# Patient Record
Sex: MALE | Race: WHITE | NOT HISPANIC OR LATINO | Employment: OTHER
[De-identification: names, ages, dates, MRNs, and addresses within clinical notes are randomized per-mention and may not be internally consistent; named-entity substitution may affect disease eponyms.]

---
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
## 2022-10-03 ENCOUNTER — HOSPITAL ENCOUNTER (EMERGENCY)
Age: 22
Discharge: HOME | End: 2022-10-03

## 2022-10-03 ENCOUNTER — APPOINTMENT (OUTPATIENT)
Dept: GENERAL RADIOLOGY | Age: 22
End: 2022-10-03
Attending: NURSE PRACTITIONER

## 2022-10-03 VITALS
TEMPERATURE: 97.5 F | HEART RATE: 55 BPM | OXYGEN SATURATION: 97 % | RESPIRATION RATE: 14 BRPM | DIASTOLIC BLOOD PRESSURE: 50 MMHG | SYSTOLIC BLOOD PRESSURE: 135 MMHG | WEIGHT: 181 LBS

## 2022-10-03 DIAGNOSIS — M54.42 ACUTE BILATERAL LOW BACK PAIN WITH BILATERAL SCIATICA: Primary | ICD-10-CM

## 2022-10-03 DIAGNOSIS — M54.41 ACUTE BILATERAL LOW BACK PAIN WITH BILATERAL SCIATICA: Primary | ICD-10-CM

## 2022-10-03 PROCEDURE — 72100 X-RAY EXAM L-S SPINE 2/3 VWS: CPT

## 2022-10-03 PROCEDURE — 10004651 HB RX, NO CHARGE ITEM: Performed by: NURSE PRACTITIONER

## 2022-10-03 PROCEDURE — 99283 EMERGENCY DEPT VISIT LOW MDM: CPT

## 2022-10-03 PROCEDURE — 10002803 HB RX 637: Performed by: NURSE PRACTITIONER

## 2022-10-03 RX ORDER — LIDOCAINE 50 MG/G
1 PATCH TOPICAL EVERY 24 HOURS
Qty: 10 PATCH | Refills: 0 | Status: SHIPPED | OUTPATIENT
Start: 2022-10-03

## 2022-10-03 RX ORDER — IBUPROFEN 600 MG/1
600 TABLET ORAL EVERY 6 HOURS PRN
Qty: 20 TABLET | Refills: 0 | Status: SHIPPED | OUTPATIENT
Start: 2022-10-03

## 2022-10-03 RX ORDER — METHYLPREDNISOLONE 4 MG/1
4 TABLET ORAL SEE ADMIN INSTRUCTIONS
Qty: 21 TABLET | Refills: 0 | Status: SHIPPED | OUTPATIENT
Start: 2022-10-03

## 2022-10-03 RX ORDER — ACETAMINOPHEN 500 MG
1000 TABLET ORAL ONCE
Status: COMPLETED | OUTPATIENT
Start: 2022-10-03 | End: 2022-10-03

## 2022-10-03 RX ORDER — CYCLOBENZAPRINE HCL 10 MG
10 TABLET ORAL 3 TIMES DAILY PRN
Qty: 15 TABLET | Refills: 0 | Status: SHIPPED | OUTPATIENT
Start: 2022-10-03

## 2022-10-03 RX ORDER — IBUPROFEN 600 MG/1
600 TABLET ORAL ONCE
Status: COMPLETED | OUTPATIENT
Start: 2022-10-03 | End: 2022-10-03

## 2022-10-03 RX ORDER — CYCLOBENZAPRINE HCL 10 MG
10 TABLET ORAL ONCE
Status: COMPLETED | OUTPATIENT
Start: 2022-10-03 | End: 2022-10-03

## 2022-10-03 RX ADMIN — ACETAMINOPHEN 1000 MG: 500 TABLET ORAL at 14:16

## 2022-10-03 RX ADMIN — CYCLOBENZAPRINE HYDROCHLORIDE 10 MG: 10 TABLET, FILM COATED ORAL at 14:16

## 2022-10-03 RX ADMIN — IBUPROFEN 600 MG: 600 TABLET ORAL at 14:15

## 2022-10-03 ASSESSMENT — PAIN SCALES - GENERAL
PAINLEVEL_OUTOF10: 9
PAINLEVEL_OUTOF10: 8

## 2022-10-03 ASSESSMENT — ENCOUNTER SYMPTOMS: BACK PAIN: 1

## 2024-10-11 ENCOUNTER — APPOINTMENT (OUTPATIENT)
Dept: GENERAL RADIOLOGY | Facility: HOSPITAL | Age: 24
End: 2024-10-11
Attending: EMERGENCY MEDICINE
Payer: OTHER GOVERNMENT

## 2024-10-11 ENCOUNTER — HOSPITAL ENCOUNTER (INPATIENT)
Facility: HOSPITAL | Age: 24
LOS: 6 days | Discharge: HOME | End: 2024-10-17
Attending: EMERGENCY MEDICINE | Admitting: INTERNAL MEDICINE
Payer: OTHER GOVERNMENT

## 2024-10-11 DIAGNOSIS — S82.392A CLOSED FRACTURE OF POSTERIOR MALLEOLUS OF LEFT TIBIA, INITIAL ENCOUNTER: ICD-10-CM

## 2024-10-11 DIAGNOSIS — S82.302A CLOSED FRACTURE OF DISTAL END OF LEFT TIBIA, UNSPECIFIED FRACTURE MORPHOLOGY, INITIAL ENCOUNTER: Primary | ICD-10-CM

## 2024-10-11 PROBLEM — S82.209A TIBIA FRACTURE: Status: ACTIVE | Noted: 2024-10-11

## 2024-10-11 LAB
ANION GAP SERPL CALC-SCNC: 9 MMOL/L (ref 0–18)
BASOPHILS # BLD AUTO: 0.04 X10(3) UL (ref 0–0.2)
BASOPHILS NFR BLD AUTO: 0.4 %
BUN BLD-MCNC: 13 MG/DL (ref 9–23)
BUN/CREAT SERPL: 14.8 (ref 10–20)
CALCIUM BLD-MCNC: 7 MG/DL (ref 8.7–10.4)
CHLORIDE SERPL-SCNC: 116 MMOL/L (ref 98–112)
CO2 SERPL-SCNC: 19 MMOL/L (ref 21–32)
CREAT BLD-MCNC: 0.88 MG/DL
DEPRECATED RDW RBC AUTO: 42.8 FL (ref 35.1–46.3)
EGFRCR SERPLBLD CKD-EPI 2021: 123 ML/MIN/1.73M2 (ref 60–?)
EOSINOPHIL # BLD AUTO: 0.03 X10(3) UL (ref 0–0.7)
EOSINOPHIL NFR BLD AUTO: 0.3 %
ERYTHROCYTE [DISTWIDTH] IN BLOOD BY AUTOMATED COUNT: 12.7 % (ref 11–15)
GLUCOSE BLD-MCNC: 82 MG/DL (ref 70–99)
HCT VFR BLD AUTO: 45.9 %
HGB BLD-MCNC: 15.9 G/DL
IMM GRANULOCYTES # BLD AUTO: 0.03 X10(3) UL (ref 0–1)
IMM GRANULOCYTES NFR BLD: 0.3 %
LYMPHOCYTES # BLD AUTO: 1.72 X10(3) UL (ref 1–4)
LYMPHOCYTES NFR BLD AUTO: 15.6 %
MCH RBC QN AUTO: 31.9 PG (ref 26–34)
MCHC RBC AUTO-ENTMCNC: 34.6 G/DL (ref 31–37)
MCV RBC AUTO: 92.2 FL
MONOCYTES # BLD AUTO: 0.62 X10(3) UL (ref 0.1–1)
MONOCYTES NFR BLD AUTO: 5.6 %
NEUTROPHILS # BLD AUTO: 8.61 X10 (3) UL (ref 1.5–7.7)
NEUTROPHILS # BLD AUTO: 8.61 X10(3) UL (ref 1.5–7.7)
NEUTROPHILS NFR BLD AUTO: 77.8 %
OSMOLALITY SERPL CALC.SUM OF ELEC: 297 MOSM/KG (ref 275–295)
PLATELET # BLD AUTO: 251 10(3)UL (ref 150–450)
POTASSIUM SERPL-SCNC: 2.9 MMOL/L (ref 3.5–5.1)
RBC # BLD AUTO: 4.98 X10(6)UL
SODIUM SERPL-SCNC: 144 MMOL/L (ref 136–145)
WBC # BLD AUTO: 11.1 X10(3) UL (ref 4–11)

## 2024-10-11 PROCEDURE — 99222 1ST HOSP IP/OBS MODERATE 55: CPT | Performed by: INTERNAL MEDICINE

## 2024-10-11 PROCEDURE — 73590 X-RAY EXAM OF LOWER LEG: CPT | Performed by: EMERGENCY MEDICINE

## 2024-10-11 PROCEDURE — 73610 X-RAY EXAM OF ANKLE: CPT | Performed by: EMERGENCY MEDICINE

## 2024-10-11 RX ORDER — POTASSIUM CHLORIDE 1500 MG/1
40 TABLET, EXTENDED RELEASE ORAL ONCE
Status: COMPLETED | OUTPATIENT
Start: 2024-10-11 | End: 2024-10-11

## 2024-10-11 RX ORDER — MORPHINE SULFATE 2 MG/ML
1 INJECTION, SOLUTION INTRAMUSCULAR; INTRAVENOUS EVERY 2 HOUR PRN
Status: DISCONTINUED | OUTPATIENT
Start: 2024-10-11 | End: 2024-10-13

## 2024-10-11 RX ORDER — ACETAMINOPHEN 500 MG
500 TABLET ORAL EVERY 4 HOURS PRN
Status: DISCONTINUED | OUTPATIENT
Start: 2024-10-11 | End: 2024-10-17

## 2024-10-11 RX ORDER — SUMATRIPTAN 100 MG/1
100 TABLET, FILM COATED ORAL EVERY 6 HOURS PRN
COMMUNITY

## 2024-10-11 RX ORDER — ONDANSETRON 2 MG/ML
4 INJECTION INTRAMUSCULAR; INTRAVENOUS EVERY 6 HOURS PRN
Status: DISCONTINUED | OUTPATIENT
Start: 2024-10-11 | End: 2024-10-17

## 2024-10-11 RX ORDER — PROCHLORPERAZINE EDISYLATE 5 MG/ML
5 INJECTION INTRAMUSCULAR; INTRAVENOUS EVERY 8 HOURS PRN
Status: DISCONTINUED | OUTPATIENT
Start: 2024-10-11 | End: 2024-10-17

## 2024-10-11 RX ORDER — MORPHINE SULFATE 4 MG/ML
4 INJECTION, SOLUTION INTRAMUSCULAR; INTRAVENOUS ONCE
Status: COMPLETED | OUTPATIENT
Start: 2024-10-11 | End: 2024-10-11

## 2024-10-11 RX ORDER — MORPHINE SULFATE 4 MG/ML
4 INJECTION, SOLUTION INTRAMUSCULAR; INTRAVENOUS EVERY 2 HOUR PRN
Status: DISCONTINUED | OUTPATIENT
Start: 2024-10-11 | End: 2024-10-13

## 2024-10-11 RX ORDER — ONDANSETRON 2 MG/ML
4 INJECTION INTRAMUSCULAR; INTRAVENOUS ONCE
Status: COMPLETED | OUTPATIENT
Start: 2024-10-11 | End: 2024-10-11

## 2024-10-11 RX ORDER — MORPHINE SULFATE 2 MG/ML
2 INJECTION, SOLUTION INTRAMUSCULAR; INTRAVENOUS EVERY 2 HOUR PRN
Status: DISCONTINUED | OUTPATIENT
Start: 2024-10-11 | End: 2024-10-13

## 2024-10-11 RX ORDER — MELATONIN
3 NIGHTLY PRN
Status: DISCONTINUED | OUTPATIENT
Start: 2024-10-11 | End: 2024-10-17

## 2024-10-12 ENCOUNTER — APPOINTMENT (OUTPATIENT)
Dept: CT IMAGING | Facility: HOSPITAL | Age: 24
End: 2024-10-12
Attending: ORTHOPAEDIC SURGERY
Payer: OTHER GOVERNMENT

## 2024-10-12 LAB
ANION GAP SERPL CALC-SCNC: 7 MMOL/L (ref 0–18)
ANTIBODY SCREEN: NEGATIVE
BUN BLD-MCNC: 13 MG/DL (ref 9–23)
BUN/CREAT SERPL: 11.6 (ref 10–20)
CALCIUM BLD-MCNC: 8.9 MG/DL (ref 8.7–10.4)
CHLORIDE SERPL-SCNC: 107 MMOL/L (ref 98–112)
CO2 SERPL-SCNC: 28 MMOL/L (ref 21–32)
CREAT BLD-MCNC: 1.12 MG/DL
EGFRCR SERPLBLD CKD-EPI 2021: 94 ML/MIN/1.73M2 (ref 60–?)
GLUCOSE BLD-MCNC: 81 MG/DL (ref 70–99)
MRSA DNA SPEC QL NAA+PROBE: NEGATIVE
OSMOLALITY SERPL CALC.SUM OF ELEC: 293 MOSM/KG (ref 275–295)
POTASSIUM SERPL-SCNC: 4.4 MMOL/L (ref 3.5–5.1)
RH BLOOD TYPE: POSITIVE
RH BLOOD TYPE: POSITIVE
SODIUM SERPL-SCNC: 142 MMOL/L (ref 136–145)

## 2024-10-12 PROCEDURE — 99233 SBSQ HOSP IP/OBS HIGH 50: CPT | Performed by: HOSPITALIST

## 2024-10-12 PROCEDURE — 73700 CT LOWER EXTREMITY W/O DYE: CPT | Performed by: ORTHOPAEDIC SURGERY

## 2024-10-12 RX ORDER — KETOROLAC TROMETHAMINE 15 MG/ML
15 INJECTION, SOLUTION INTRAMUSCULAR; INTRAVENOUS EVERY 6 HOURS PRN
Status: DISPENSED | OUTPATIENT
Start: 2024-10-12 | End: 2024-10-14

## 2024-10-12 RX ORDER — MORPHINE SULFATE 2 MG/ML
2 INJECTION, SOLUTION INTRAMUSCULAR; INTRAVENOUS ONCE
Status: COMPLETED | OUTPATIENT
Start: 2024-10-12 | End: 2024-10-12

## 2024-10-12 RX ORDER — HYDROCODONE BITARTRATE AND ACETAMINOPHEN 10; 325 MG/1; MG/1
1 TABLET ORAL EVERY 4 HOURS PRN
Status: DISCONTINUED | OUTPATIENT
Start: 2024-10-12 | End: 2024-10-17

## 2024-10-12 RX ORDER — SODIUM CHLORIDE 9 MG/ML
INJECTION, SOLUTION INTRAVENOUS CONTINUOUS
Status: DISCONTINUED | OUTPATIENT
Start: 2024-10-12 | End: 2024-10-17

## 2024-10-12 NOTE — PROGRESS NOTES
[facility]  part of [facility]    Progress Note    Dann Toro Patient Status:  Inpatient    2000 MRN [id number]   Location [facility] 4W/SW/SE Attending Michelle Chapman MD   Hosp Day # 1 PCP No primary care provider on file.     Chief Complaint: left tibia fx    SUBJECTIVE:    No acute events overnight.  Patient denies chest pain, sob.  No fevers/chills.  No n/v/abd pain.  Ankle pain controlled, waiting for sx.    10 ROS completed and otherwise negative.    OBJECTIVE:  Vital signs in last 24 hours:  /78 (BP Location: Right arm)   Pulse 64   Temp 98.3 °F (36.8 °C) (Oral)   Resp 18   Ht 5' 9\" (1.753 m)   Wt 186 lb 4.8 oz (84.5 kg)   SpO2 100%   BMI 27.51 kg/m²     Intake/Output:  No intake or output data in the 24 hours ending 10/12/24 1107    Wt Readings from Last 3 Encounters:   10/12/24 186 lb 4.8 oz (84.5 kg)       Exam     Gen: No acute distress  Pulm: Lungs clear, normal respiratory effort  CV: Heart with regular rate and rhythm  Abd: Abdomen soft, nontender, bowel sounds present  Neuro: No acute focal deficits  MSK: moves extremities  Skin: Warm and dry  Psych: Normal affect  Ext: no c/c/e    Data Review:     Labs:   Lab Results   Component Value Date    WBC 11.1 10/11/2024    HGB 15.9 10/11/2024    HCT 45.9 10/11/2024    .0 10/11/2024    CREATSERUM 1.12 10/12/2024    BUN 13 10/12/2024     10/12/2024    K 4.4 10/12/2024     10/12/2024    CO2 28.0 10/12/2024    GLU 81 10/12/2024    CA 8.9 10/12/2024       Imaging: Reviewed    XR TIBIA + FIBULA (2 VIEWS), LEFT (CPT=73590)    Result Date: 10/11/2024  CONCLUSION:   Left Tibia/fibula:  Oblique fracture of the distal tibial diaphysis with mild displacement and comminution.  Nondisplaced posterior malleolar fracture of the distal tibia with extension to the tibiotalar joint.  The fibula appears intact.  Soft tissue swelling of the lower leg.  Left ankle:  Fractures of the distal tibia  as above.  No acute dislocation or subluxation. No swelling of the ankle.    elm-remote     Dictated by (CST): Coy Loyola MD on 10/11/2024 at 10:24 PM     Finalized by (CST): Coy Loyola MD on 10/11/2024 at 10:28 PM          XR ANKLE (MIN 3 VIEWS), LEFT (CPT=73610)    Result Date: 10/11/2024  CONCLUSION:   Left Tibia/fibula:  Oblique fracture of the distal tibial diaphysis with mild displacement and comminution.  Nondisplaced posterior malleolar fracture of the distal tibia with extension to the tibiotalar joint.  The fibula appears intact.  Soft tissue swelling of the lower leg.  Left ankle:  Fractures of the distal tibia as above.  No acute dislocation or subluxation. No swelling of the ankle.    elm-remote     Dictated by (CST): Coy Loyola MD on 10/11/2024 at 10:24 PM     Finalized by (CST): Coy Loyola MD on 10/11/2024 at 10:28 PM           Meds:   Current Facility-Administered Medications   Medication Dose Route Frequency    ketorolac (Toradol) 15 MG/ML injection 15 mg  15 mg Intravenous Q6H PRN    sodium chloride 0.9% infusion   Intravenous Continuous    acetaminophen (Tylenol Extra Strength) tab 500 mg  500 mg Oral Q4H PRN    morphINE PF 2 MG/ML injection 1 mg  1 mg Intravenous Q2H PRN    Or    morphINE PF 2 MG/ML injection 2 mg  2 mg Intravenous Q2H PRN    Or    morphINE PF 4 MG/ML injection 4 mg  4 mg Intravenous Q2H PRN    melatonin tab 3 mg  3 mg Oral Nightly PRN    ondansetron (Zofran) 4 MG/2ML injection 4 mg  4 mg Intravenous Q6H PRN    prochlorperazine (Compazine) 10 MG/2ML injection 5 mg  5 mg Intravenous Q8H PRN         Assessment  Patient Active Problem List   Diagnosis    Tibia fracture    Closed fracture of distal end of left tibia, unspecified fracture morphology, initial encounter    Closed fracture of posterior malleolus of left tibia, initial encounter       Plan:     Left tibia/ankle fracture  -Admit  -N.p.o. pending surgery  -Pain control with IV pain meds prn  -Orthopedic consult.   To the OR today  -Nonweightbearing     Hypokalemia  -Replace per protocol     Mild leukocytosis, likely reactive     HTN, likely pain induced  -Optimize pain control  -Improved     Prophylaxis:   DVT with scd for now and post op per ortho    Dispo: pending clinical course    Global A/P  - reviewed labs and vitals from today  - reviewed notes of the day  - cbc, bmp, mag ordered for tomorrow  - discussed need to stay in the hospital today due to need for surgery  - cont IV pain meds prn  - discussed with patient/RN and care team    MDM: High complexity-  IV meds requiring close inpatient monitoring. I personally spent time on chart/note review, review of labs/imaging, discussion with patient, physical exam, discussion with staff, writing progress note, and discussion of plan of care.      Michelle Chapman MD  10/12/2024  11:07 AM

## 2024-10-12 NOTE — PLAN OF CARE
Patient AOX4. Room air. General diet, tolerated well. Refusing SCDS. Voiding via urinal. PRN norco morphine toradol given for pain management. Call light within reach.     Problem: Patient Centered Care  Goal: Patient preferences are identified and integrated in the patient's plan of care  Description: Interventions:  - What would you like us to know as we care for you?   - Provide timely, complete, and accurate information to patient/family  - Incorporate patient and family knowledge, values, beliefs, and cultural backgrounds into the planning and delivery of care  - Encourage patient/family to participate in care and decision-making at the level they choose  - Honor patient and family perspectives and choices  Outcome: Progressing     Problem: Patient/Family Goals  Goal: Patient/Family Long Term Goal  Description: Patient's Long Term Goal:     Interventions:  -   - See additional Care Plan goals for specific interventions  Outcome: Progressing  Goal: Patient/Family Short Term Goal  Description: Patient's Short Term Goal:     Interventions:   - See additional Care Plan goals for specific interventions  Outcome: Progressing     Problem: PAIN - ADULT  Goal: Verbalizes/displays adequate comfort level or patient's stated pain goal  Description: INTERVENTIONS:  - Encourage pt to monitor pain and request assistance  - Assess pain using appropriate pain scale  - Administer analgesics based on type and severity of pain and evaluate response  - Implement non-pharmacological measures as appropriate and evaluate response  - Consider cultural and social influences on pain and pain management  - Manage/alleviate anxiety  - Utilize distraction and/or relaxation techniques  - Monitor for opioid side effects  - Notify MD/LIP if interventions unsuccessful or patient reports new pain  - Anticipate increased pain with activity and pre-medicate as appropriate  Outcome: Progressing     Problem: SAFETY ADULT - FALL  Goal: Free from fall  injury  Description: INTERVENTIONS:  - Assess pt frequently for physical needs  - Identify cognitive and physical deficits and behaviors that affect risk of falls.  - [place] fall precautions as indicated by assessment.  - Educate pt/family on patient safety including physical limitations  - Instruct pt to call for assistance with activity based on assessment  - Modify environment to reduce risk of injury  - Provide assistive devices as appropriate  - Consider OT/PT consult to assist with strengthening/mobility  - Encourage toileting schedule  Outcome: Progressing

## 2024-10-12 NOTE — SPIRITUAL CARE NOTE
Spiritual Care Visit Note    Patient Name: Dann Toro Date of Spiritual Care Visit: 10/12/24   : 2000 Primary Dx: Closed fracture of distal end of left tibia, unspecified fracture morphology, initial encounter       Referred By: Referral From: Nurse    Spiritual Care Taxonomy:    Intended Effects: Promote a sense of peace    Methods: Offer spiritual/Hindu support    Interventions: [place]    Visit Type/Summary:     - Spiritual Care: Responded to a request for spiritual care and assessed the patient for spiritual care needs.  remains available as needed for follow up.    Spiritual Care support can be requested via an [facility] consult. For urgent/immediate needs, please contact the On Call  at: [place]: ext [phone]         Rev. Shonda Barajas

## 2024-10-12 NOTE — PLAN OF CARE
ED admit for tibia fracture. Pt alert and oriented x4 on room air. Spouse at bedside overnight. Splint to LLE clean, dry, and intact. Sensation intact, able to wiggle toes. Declining SCDs, encouraged RLE ankle pumps. Pain mainly located in L ankle with a little pain in R ankle per pt. PRN morphine and heat packs given for pain. NWB to LLE. NPO since midnight. Call light within reach. Ortho to evaluate.     Problem: Patient Centered Care  Goal: Patient preferences are identified and integrated in the patient's plan of care  Description: Interventions:  - What would you like us to know as we care for you?   - Provide timely, complete, and accurate information to patient/family  - Incorporate patient and family knowledge, values, beliefs, and cultural backgrounds into the planning and delivery of care  - Encourage patient/family to participate in care and decision-making at the level they choose  - Honor patient and family perspectives and choices  Outcome: Progressing     Problem: Patient/Family Goals  Goal: Patient/Family Long Term Goal  Description: Patient's Long Term Goal:     Interventions:  -  - See additional Care Plan goals for specific interventions  Outcome: Progressing  Goal: Patient/Family Short Term Goal  Description: Patient's Short Term Goal:     Interventions:   -   - See additional Care Plan goals for specific interventions  Outcome: Progressing     Problem: PAIN - ADULT  Goal: Verbalizes/displays adequate comfort level or patient's stated pain goal  Description: INTERVENTIONS:  - Encourage pt to monitor pain and request assistance  - Assess pain using appropriate pain scale  - Administer analgesics based on type and severity of pain and evaluate response  - Implement non-pharmacological measures as appropriate and evaluate response  - Consider cultural and social influences on pain and pain management  - Manage/alleviate anxiety  - Utilize distraction and/or relaxation techniques  - Monitor for  opioid side effects  - Notify MD/LIP if interventions unsuccessful or patient reports new pain  - Anticipate increased pain with activity and pre-medicate as appropriate  Outcome: Progressing     Problem: SAFETY ADULT - FALL  Goal: Free from fall injury  Description: INTERVENTIONS:  - Assess pt frequently for physical needs  - Identify cognitive and physical deficits and behaviors that affect risk of falls.  - [place] fall precautions as indicated by assessment.  - Educate pt/family on patient safety including physical limitations  - Instruct pt to call for assistance with activity based on assessment  - Modify environment to reduce risk of injury  - Provide assistive devices as appropriate  - Consider OT/PT consult to assist with strengthening/mobility  - Encourage toileting schedule  Outcome: Progressing

## 2024-10-12 NOTE — ED QUICK NOTES
Orders for admission, patient is aware of plan and ready to go upstairs. Any questions, please call ED RN Natasha at extension [phone].     Patient Covid vaccination status: Fully vaccinated     COVID Test Ordered in ED: None    COVID Suspicion at Admission: N/A    Running Infusions:  None    Mental Status/LOC at time of transport: AOx4    Other pertinent information:   CIWA score: N/A   NIH score:  N/A

## 2024-10-12 NOTE — CONSULTS
Chart and imaging reviewed, left tibia fracture, plan for delayed nailing.  No evidence of current or evolving compartment syndrome.  May have diet today and tomorrow, plan for surgery Monday. Recommend ASA for DVT ppx.      Cancienne

## 2024-10-12 NOTE — ED PROVIDER NOTES
Patient Seen in: [facility] Emergency Department    History     Chief Complaint   Patient presents with    Leg or Foot Injury       HPI    History is provided by patient/independent historian: patient, EMS  24 year old male with  history of migraine here with complaints of a fall while playing ice hockey.  Patient reports falling to his knees and turning around into the baseboards, using his ankles to brace himself.  He was unable to get up after that.  Medics report needing to cut off his boot.  Positive deformity per them.  No numbness.    History reviewed.   Past Medical History:    Migraine         History reviewed. History reviewed. No pertinent surgical history.      Home Medications reviewed :  Prescriptions Prior to Admission[1]      History reviewed.   Social History     Socioeconomic History    Marital status:    Tobacco Use    Smoking status: Never    Smokeless tobacco: Never   Substance and Sexual Activity    Alcohol use: Not Currently    Drug use: Never         ROS  Review of Systems   Respiratory:  Negative for shortness of breath.    Cardiovascular:  Negative for chest pain.   Musculoskeletal:  Positive for arthralgias and gait problem.   All other systems reviewed and are negative.     All other pertinent organ systems are reviewed and are negative.      Physical Exam     ED Triage Vitals [10/11/24 2102]   BP (!) 162/77   Pulse 109   Resp 22   Temp    Temp src    SpO2 96 %   O2 Device None (Room air)     Vital signs reviewed.      Physical Exam  Vitals and nursing note reviewed.   Cardiovascular:      Pulses: Normal pulses.   Pulmonary:      Effort: No respiratory distress.   Abdominal:      General: There is no distension.   Musculoskeletal:      Comments: L ankle tender to palpation over b/l malleoli, no fibular head tenderness, 2+ dp pulse, able to wiggle toes   Neurological:      Mental Status: He is alert.         ED Course       Labs:     Labs Reviewed   CBC WITH DIFFERENTIAL  WITH PLATELET - Abnormal; Notable for the following components:       Result Value    WBC 11.1 (*)     Neutrophil Absolute Prelim 8.61 (*)     Neutrophil Absolute 8.61 (*)     All other components within normal limits   BASIC METABOLIC PANEL (8)         My EKG Interpretation:   As reviewed and Interpreted by me      Imaging Results Available and Reviewed while in ED:   XR TIBIA + FIBULA (2 VIEWS), LEFT (CPT=73590)    Result Date: 10/11/2024  CONCLUSION:   Left Tibia/fibula:  Oblique fracture of the distal tibial diaphysis with mild displacement and comminution.  Nondisplaced posterior malleolar fracture of the distal tibia with extension to the tibiotalar joint.  The fibula appears intact.  Soft tissue swelling of the lower leg.  Left ankle:  Fractures of the distal tibia as above.  No acute dislocation or subluxation. No swelling of the ankle.    elm-remote     Dictated by (CST): Coy Loyola MD on 10/11/2024 at 10:24 PM     Finalized by (CST): Coy Loyola MD on 10/11/2024 at 10:28 PM          XR ANKLE (MIN 3 VIEWS), LEFT (CPT=73610)    Result Date: 10/11/2024  CONCLUSION:   Left Tibia/fibula:  Oblique fracture of the distal tibial diaphysis with mild displacement and comminution.  Nondisplaced posterior malleolar fracture of the distal tibia with extension to the tibiotalar joint.  The fibula appears intact.  Soft tissue swelling of the lower leg.  Left ankle:  Fractures of the distal tibia as above.  No acute dislocation or subluxation. No swelling of the ankle.    elm-remote     Dictated by (CST): Coy Loyola MD on 10/11/2024 at 10:24 PM     Finalized by (CST): Coy Loyola MD on 10/11/2024 at 10:28 PM         My review and independent interpretation of XR images: distal fracture of tibia. Radiology report corroborates this in addition to other details as reported by them.      Decision rules/scores evaluated: none      Diagnostic labs/tests considered but not ordered: CBC, BMP, type and screen    ED  Medications Administered:   Medications   morphINE PF 4 MG/ML injection 4 mg (4 mg Intravenous Given 10/11/24 2118)   ondansetron (Zofran) 4 MG/2ML injection 4 mg (4 mg Intravenous Given 10/11/24 2123)   morphINE PF 4 MG/ML injection 4 mg (4 mg Intravenous Given 10/11/24 2242)                MDM       Medical Decision Making      Differential Diagnosis: After obtaining the patient's history, performing the physical exam and reviewing the diagnostics, multiple initial diagnoses were considered based on the presenting problem including fall, fracture, contusion, dislocation    External document review: I personally reviewed available external medical records for any recent pertinent discharge summaries, testing, and procedures - the findings are as follows: 10/3/22 visit with Dr. Dailey for b/l back pain    Complicating Factors: The patient already  has a past medical history of Migraine. to contribute to the complexity of this ED evaluation.    Procedures performed:   Pain Control  The patient was offered pain medication in the Emergency Department.  Analgesia was given.    PROCEDURE:  Splint application  A short leg splint was applied to the patient by a tech and adjusted by me.  The patient was neurovascularly intact as checked by me after application.      Discussed management with physician/appropriate source: Dr. Akbar, Dr. Hernandez    Considered admission/deescalation of care for: none    Social determinants of health affecting patient care: none    Prescription medications considered: discussed continuing current medication regimen    The patient requires continuous monitoring for: ankle pain    Shared decision making: discussed possible admission          Disposition and Plan     Clinical Impression:  1. Closed fracture of distal end of left tibia, unspecified fracture morphology, initial encounter    2. Closed fracture of posterior malleolus of left tibia, initial encounter         Disposition:  Admit    Follow-up:  No follow-up provider specified.    Medications Prescribed:  There are no discharge medications for this patient.      Hospital Problems       Present on Admission             ICD-10-CM Noted POA    Tibia fracture S82.209A 10/11/2024 Unknown                     [1] (Not in a hospital admission)

## 2024-10-12 NOTE — H&P
[facility]  part of [facility]                                                                                                          History and Physical     Dann Toro Patient Status:  Emergency    2000 MRN [id number]   Location [facility] EMERGENCY DEPARTMENT Attending Anna Russo MD   Hosp Day # 0 PCP No primary care provider on file.     Multiple teammates/'brothers' present in room.  Patient consented to their presence during encounter.    Chief Complaint: Left foot pain    Subjective:    Dann Toro is a 24 year old male with history of migraines who presented to the ED with complaints of left foot pain.  Patient fell while playing hockey.  He fell on his knees, he used his ankles to brace himself.  Developed left foot pain, EMT called.  EMT had to cut off his boots, he was splinted by medics prior to ED arrival.  No numbness or tingling.  Intact pulses noted by ED exam.  No head injury or loss of consciousness.  No other reported injuries.    Laboratory potassium 2.9  Vitals with HTN  X-ray with oblique fracture of the distal left tibial diaphysis with mild displacement and comminution.  Nondisplaced posterior malleolar fracture of the distal tibia with extension to the tibial talar joint.    Orthopedics consulted.  Has been admitted for further treatment.    History/Other:      Past Medical History:  Past Medical History:    Migraine        Past Surgical History: History reviewed. No pertinent surgical history.    Social History:  reports that he has never smoked. He has never used smokeless tobacco. He reports that he does not currently use alcohol. He reports that he does not use drugs.  Uses nicotine pouches    Family History: No family history on file.  Father with multiple medical issues    Allergies: Allergies[1]    Medications:  Medications Ordered Prior to Encounter[2]    Review of Systems:   A comprehensive 14 point review of systems was  completed.    Pertinent positives and negatives noted in the HPI.    Objective:     /86   Pulse 98   Resp 15   Ht 5' 9\" (1.753 m)   Wt 187 lb (84.8 kg)   SpO2 96%   BMI 27.62 kg/m²   General: No acute distress.    HEENT: Normocephalic, atraumatic.  Neck: Supple.  Respiratory: Normal effort.  CTAB  Cardiovascular: S1 S2 regular.  Normal rate.  No murmur.   Abdomen: Soft, not tender or distended.  Neurologic: Alert, oriented x 4.  Nonfocal.  Musculoskeletal: Left lower extremity splinted.  Toes appear pink and well-perfused.  Extremities: No edema RLE  Psychiatric: Cooperative and appropriate    Results:      Labs:  Labs Last 24 Hours:   BMP     CBC    Other     Na - Cl - BUN - Glu -   Hb 15.9   PTT - Procal -   K - CO2 - Cr -   WBC 11.1 >< .0  INR - CRP -   Renal Lytes Endo    Hct 45.9   Trop - D dim -   eGFR - Ca - POC Gluc  -    LFT   pBNP - Lactic -   eGFR AA - PO4 - A1c -   AST - APk - Prot -  LDL -     Mg - TSH -   ALT - T cristal - Alb -          COVID-19 Lab Results    COVID-19  No results found for: \"COVID19\"    Pro-Calcitonin  No results for input(s): \"PCT\" in the last 168 hours.    Cardiac  No results for input(s): \"TROP\", \"PBNP\" in the last 168 hours.    Creatinine Kinase  No results for input(s): \"CK\" in the last 168 hours.    Inflammatory Markers  No results for input(s): \"CRP\", \"YASHIRA\", \"LDH\", \"DDIMER\" in the last 168 hours.    Imaging: Imaging data reviewed in Epic.    Assessment & Plan:    Left tibia/ankle fracture  -Admit  -N.p.o. after midnight  -Pain control  -Orthopedic consult.  Await evaluation recommendations  -Nonweightbearing    Hypokalemia  -Replace per protocol    Mild leukocytosis, likely reactive    HTN, likely pain induced  -Optimize pain control    Quality:  DVT Prophylaxis: SCD pending orthopedic evaluation  CODE status: Full code  CHRISTINA: 2 to 3 days    Plan of care discussed with patient. Acknowledged understanding and agrees to plan. Also discussed with the ED  physician.    Moderate MDM  Time spent on this admission - examining patient, obtaining history, reviewing previous medical records, going over test results/imaging and discussing plan of care. More than 50% of the time was spent in counseling and/or coordination of care related to left lower extremity fracture.   All questions answered.     Teagan Barkley MD  10/11/2024                   [1] No Known Allergies  [2]   No current facility-administered medications on file prior to encounter.     No current outpatient medications on file prior to encounter.

## 2024-10-12 NOTE — ED INITIAL ASSESSMENT (HPI)
Patient arrives via EMS with reports of falling while playing ice hockey. Patient reports feeling pain in L foot. Splinted by medics upon arrival.   100mcg of fentanyl en route. +pedal pulses.

## 2024-10-13 ENCOUNTER — ANESTHESIA (OUTPATIENT)
Dept: SURGERY | Facility: HOSPITAL | Age: 24
End: 2024-10-13
Payer: OTHER GOVERNMENT

## 2024-10-13 ENCOUNTER — ANESTHESIA EVENT (OUTPATIENT)
Dept: SURGERY | Facility: HOSPITAL | Age: 24
End: 2024-10-13
Payer: OTHER GOVERNMENT

## 2024-10-13 LAB
ANION GAP SERPL CALC-SCNC: 5 MMOL/L (ref 0–18)
BUN BLD-MCNC: 14 MG/DL (ref 9–23)
BUN/CREAT SERPL: 13 (ref 10–20)
CALCIUM BLD-MCNC: 8.6 MG/DL (ref 8.7–10.4)
CHLORIDE SERPL-SCNC: 106 MMOL/L (ref 98–112)
CO2 SERPL-SCNC: 29 MMOL/L (ref 21–32)
CREAT BLD-MCNC: 1.08 MG/DL
EGFRCR SERPLBLD CKD-EPI 2021: 98 ML/MIN/1.73M2 (ref 60–?)
GLUCOSE BLD-MCNC: 107 MG/DL (ref 70–99)
MAGNESIUM SERPL-MCNC: 2.2 MG/DL (ref 1.6–2.6)
OSMOLALITY SERPL CALC.SUM OF ELEC: 291 MOSM/KG (ref 275–295)
POTASSIUM SERPL-SCNC: 4.3 MMOL/L (ref 3.5–5.1)
SODIUM SERPL-SCNC: 140 MMOL/L (ref 136–145)

## 2024-10-13 PROCEDURE — 99233 SBSQ HOSP IP/OBS HIGH 50: CPT | Performed by: INTERNAL MEDICINE

## 2024-10-13 RX ORDER — HYDROMORPHONE HYDROCHLORIDE 1 MG/ML
0.8 INJECTION, SOLUTION INTRAMUSCULAR; INTRAVENOUS; SUBCUTANEOUS EVERY 2 HOUR PRN
Status: DISCONTINUED | OUTPATIENT
Start: 2024-10-13 | End: 2024-10-17

## 2024-10-13 RX ORDER — HYDROMORPHONE HYDROCHLORIDE 1 MG/ML
1.2 INJECTION, SOLUTION INTRAMUSCULAR; INTRAVENOUS; SUBCUTANEOUS EVERY 2 HOUR PRN
Status: DISCONTINUED | OUTPATIENT
Start: 2024-10-13 | End: 2024-10-17

## 2024-10-13 RX ORDER — HYDROMORPHONE HYDROCHLORIDE 1 MG/ML
0.4 INJECTION, SOLUTION INTRAMUSCULAR; INTRAVENOUS; SUBCUTANEOUS EVERY 2 HOUR PRN
Status: DISCONTINUED | OUTPATIENT
Start: 2024-10-13 | End: 2024-10-17

## 2024-10-13 NOTE — PROGRESS NOTES
Progress Note     Dann Toro Patient Status:  Inpatient    2000 MRN [id number]   Location [facility] 4W/SW/SE Attending Michelle Chapman MD   Hosp Day # 2 PCP No primary care provider on file.     Chief Complaint:   Chief Complaint   Patient presents with    Leg or Foot Injury         Subjective:   S: Patient seen and examined, chart reviewed.   Pain has not been well controlled on norco and IV morphine. Tolerating diet.      Review of Systems:   10 point ROS completed and was negative, except for pertinent positive and negatives stated in subjective.                Objective:   Vital signs:  Temp:  [97.9 °F (36.6 °C)-98.1 °F (36.7 °C)] 98 °F (36.7 °C)  Pulse:  [54-75] 57  Resp:  [18-21] 18  BP: (114-132)/(46-62) 132/56  SpO2:  [97 %-100 %] 97 %    Wt Readings from Last 6 Encounters:   10/12/24 186 lb 4.8 oz (84.5 kg)       Physical Exam:    General: No acute distress.   Respiratory: Clear to auscultation bilaterally. No wheezes. No rhonchi.  Cardiovascular: S1, S2. Regular rate and rhythm. No murmurs, rubs or gallops.   Abdomen: Soft, nontender, nondistended.  Positive bowel sounds. No rebound or guarding.  Neurologic: No focal neurological deficits.   Musculoskeletal: Moves all extremities.  Extremities: No edema. Good color and movement of left foot toes    Results:   Diagnostic Data:      Labs:    Labs Last 24 Hours:   BMP     CBC    Other     Na 140 Cl 106 BUN 14 Glu 107   Hb -   PTT - Procal -   K 4.3 CO2 29.0 Cr 1.08   WBC - >< PLT -  INR - CRP -   Renal Lytes Endo    Hct -   Trop - D dim -   eGFR - Ca 8.6 POC Gluc  -    LFT   pBNP - Lactic -   eGFR AA - PO4 - A1c -   AST - APk - Prot -  LDL -      Recent Labs   Lab 10/11/24  2223   RBC 4.98   HGB 15.9   HCT 45.9   MCV 92.2   MCH 31.9   MCHC 34.6   RDW 12.7   NEPRELIM 8.61*   WBC 11.1*   .0       No results found for: \"PT\", \"INR\"    Recent Labs   Lab 10/11/24  2223 10/12/24  0546 10/13/24  0559   GLU 82 81 107*   BUN 13 13  14   CREATSERUM 0.88 1.12 1.08   CA 7.0* 8.9 8.6*    142 140   K 2.9* 4.4 4.3   * 107 106   CO2 19.0* 28.0 29.0       No results for input(s): \"MARTIR\", \"LIP\" in the last 168 hours.    Recent Labs   Lab 10/13/24  0559   MG 2.2       No results for input(s): \"URINE\", \"CULTI\", \"BLDSMR\" in the last 168 hours.      CT ANKLE LEFT (CPT=73700)    Result Date: 10/12/2024  CONCLUSION:  1.  Acute oblique/spiral fracture in the distal shaft of the left tibia.  Minimal comminution along the proximal and distal margins of the fracture.  Approximate 3 mm lateral subluxation of the major distal fragment. 2.  Acute nondisplaced posterior malleolus fracture of the distal left tibia.   Dictated by (CST): Curt Goldman MD on 10/12/2024 at 2:15 PM     Finalized by (CST): Curt Goldman MD on 10/12/2024 at 2:20 PM          XR TIBIA + FIBULA (2 VIEWS), LEFT (CPT=73590)    Result Date: 10/11/2024  CONCLUSION:   Left Tibia/fibula:  Oblique fracture of the distal tibial diaphysis with mild displacement and comminution.  Nondisplaced posterior malleolar fracture of the distal tibia with extension to the tibiotalar joint.  The fibula appears intact.  Soft tissue swelling of the lower leg.  Left ankle:  Fractures of the distal tibia as above.  No acute dislocation or subluxation. No swelling of the ankle.    elm-remote     Dictated by (CST): Coy Loyola MD on 10/11/2024 at 10:24 PM     Finalized by (CST): Coy Loyola MD on 10/11/2024 at 10:28 PM          XR ANKLE (MIN 3 VIEWS), LEFT (CPT=73610)    Result Date: 10/11/2024  CONCLUSION:   Left Tibia/fibula:  Oblique fracture of the distal tibial diaphysis with mild displacement and comminution.  Nondisplaced posterior malleolar fracture of the distal tibia with extension to the tibiotalar joint.  The fibula appears intact.  Soft tissue swelling of the lower leg.  Left ankle:  Fractures of the distal tibia as above.  No acute dislocation or subluxation. No swelling of the ankle.     elm-remote     Dictated by (CST): Coy Loyola MD on 10/11/2024 at 10:24 PM     Finalized by (CST): Coy Loyola MD on 10/11/2024 at 10:28 PM               Pro-Calcitonin  No results for input(s): \"PCT\" in the last 168 hours.    Cardiac  No results for input(s): \"TROP\", \"PBNP\" in the last 168 hours.    Imaging: Imaging data reviewed in Epic.    CT ANKLE LEFT (CPT=73700)    Result Date: 10/12/2024  CONCLUSION:  1.  Acute oblique/spiral fracture in the distal shaft of the left tibia.  Minimal comminution along the proximal and distal margins of the fracture.  Approximate 3 mm lateral subluxation of the major distal fragment. 2.  Acute nondisplaced posterior malleolus fracture of the distal left tibia.   Dictated by (CST): Curt Goldman MD on 10/12/2024 at 2:15 PM     Finalized by (CST): Curt Goldman MD on 10/12/2024 at 2:20 PM          XR TIBIA + FIBULA (2 VIEWS), LEFT (CPT=73590)    Result Date: 10/11/2024  CONCLUSION:   Left Tibia/fibula:  Oblique fracture of the distal tibial diaphysis with mild displacement and comminution.  Nondisplaced posterior malleolar fracture of the distal tibia with extension to the tibiotalar joint.  The fibula appears intact.  Soft tissue swelling of the lower leg.  Left ankle:  Fractures of the distal tibia as above.  No acute dislocation or subluxation. No swelling of the ankle.    elm-remote     Dictated by (CST): Coy Loyola MD on 10/11/2024 at 10:24 PM     Finalized by (CST): Coy Loyola MD on 10/11/2024 at 10:28 PM          XR ANKLE (MIN 3 VIEWS), LEFT (CPT=73610)    Result Date: 10/11/2024  CONCLUSION:   Left Tibia/fibula:  Oblique fracture of the distal tibial diaphysis with mild displacement and comminution.  Nondisplaced posterior malleolar fracture of the distal tibia with extension to the tibiotalar joint.  The fibula appears intact.  Soft tissue swelling of the lower leg.  Left ankle:  Fractures of the distal tibia as above.  No acute dislocation or subluxation. No  swelling of the ankle.    elm-remote     Dictated by (CST): Coy Loyola MD on 10/11/2024 at 10:24 PM     Finalized by (CST): Coy Loyola MD on 10/11/2024 at 10:28 PM              Medications:     Assessment & Plan:   ASSESSMENT / PLAN:     Left tibia/ankle fracture  -N.p.o. after MN, pending surgery tomorrow  -Pain control with IV pain meds prn.  Change to Dilaudid for better control  -Orthopedic consult.  To the OR today  -Nonweightbearing     Hypokalemia - replaced  -Replace per protocol     Mild leukocytosis, likely reactive     HTN, likely pain induced  -Optimize pain control  -Improved     Prophylaxis:   DVT with scd for now and post op per ortho     Dispo: pending clinical course      dvt prophylaxis: Early ambulaton  code status: full code  dispo: home upon medical clearance    I personally reviewed the available laboratories, imaging including xray. I discussed/will discuss the case with patient and nurse. I ordered laboratories, studies including am labs. I adjusted medications including pain meds. Medical decision making high, risk is high.    >55min spent, >50% spent counseling and coordinating care in the form of educating pt/family and d/w consultants and staff. Most of the time spent discussing the above plan.     Estimated date of discharge: 1-2 days  Discharge is dependent on: complete surgery  At this point Mr. Toro is expected to be discharge to: home    Plan of care discussed with patient and nurse    Julian Bhat MD, FAAP, FACP  [facility] Hospitalist  I respond to Epic Chat and AMS Connect

## 2024-10-13 NOTE — PLAN OF CARE
Graham is alert x 4. Voiding freely. Receiving the following for pain: Morphine 4mg IVP, Norco 10 (1) orally and Toradol IVP roughly about q2h between everything - until he falls asleep. Fall precautions are in place. Call light used for needs. According to ortho note, surgery will be planned for Monday. Discharge planning TBD.

## 2024-10-13 NOTE — PLAN OF CARE
AOX4. Room air. General diet tolerated well. NPO midnight. Voiding via the urinal. PRN morphine norco dilaudid given for pain management. Call light within reach. Surgery tomorrow.     Problem: Patient Centered Care  Goal: Patient preferences are identified and integrated in the patient's plan of care  Description: Interventions:  - What would you like us to know as we care for you?   - Provide timely, complete, and accurate information to patient/family  - Incorporate patient and family knowledge, values, beliefs, and cultural backgrounds into the planning and delivery of care  - Encourage patient/family to participate in care and decision-making at the level they choose  - Honor patient and family perspectives and choices  Outcome: Progressing     Problem: Patient/Family Goals  Goal: Patient/Family Long Term Goal  Description: Patient's Long Term Goal:     Interventions:  - See additional Care Plan goals for specific interventions  Outcome: Progressing  Goal: Patient/Family Short Term Goal  Description: Patient's Short Term Goal:     Interventions:   - See additional Care Plan goals for specific interventions  Outcome: Progressing     Problem: PAIN - ADULT  Goal: Verbalizes/displays adequate comfort level or patient's stated pain goal  Description: INTERVENTIONS:  - Encourage pt to monitor pain and request assistance  - Assess pain using appropriate pain scale  - Administer analgesics based on type and severity of pain and evaluate response  - Implement non-pharmacological measures as appropriate and evaluate response  - Consider cultural and social influences on pain and pain management  - Manage/alleviate anxiety  - Utilize distraction and/or relaxation techniques  - Monitor for opioid side effects  - Notify MD/LIP if interventions unsuccessful or patient reports new pain  - Anticipate increased pain with activity and pre-medicate as appropriate  Outcome: Progressing     Problem: SAFETY ADULT - FALL  Goal: Free  from fall injury  Description: INTERVENTIONS:  - Assess pt frequently for physical needs  - Identify cognitive and physical deficits and behaviors that affect risk of falls.  - [place] fall precautions as indicated by assessment.  - Educate pt/family on patient safety including physical limitations  - Instruct pt to call for assistance with activity based on assessment  - Modify environment to reduce risk of injury  - Provide assistive devices as appropriate  - Consider OT/PT consult to assist with strengthening/mobility  - Encourage toileting schedule  Outcome: Progressing

## 2024-10-14 ENCOUNTER — APPOINTMENT (OUTPATIENT)
Dept: GENERAL RADIOLOGY | Facility: HOSPITAL | Age: 24
End: 2024-10-14
Attending: STUDENT IN AN ORGANIZED HEALTH CARE EDUCATION/TRAINING PROGRAM
Payer: OTHER GOVERNMENT

## 2024-10-14 PROCEDURE — 0QHH04Z INSERTION OF INTERNAL FIXATION DEVICE INTO LEFT TIBIA, OPEN APPROACH: ICD-10-PCS | Performed by: STUDENT IN AN ORGANIZED HEALTH CARE EDUCATION/TRAINING PROGRAM

## 2024-10-14 PROCEDURE — 73564 X-RAY EXAM KNEE 4 OR MORE: CPT | Performed by: STUDENT IN AN ORGANIZED HEALTH CARE EDUCATION/TRAINING PROGRAM

## 2024-10-14 PROCEDURE — 73590 X-RAY EXAM OF LOWER LEG: CPT | Performed by: STUDENT IN AN ORGANIZED HEALTH CARE EDUCATION/TRAINING PROGRAM

## 2024-10-14 PROCEDURE — 99233 SBSQ HOSP IP/OBS HIGH 50: CPT | Performed by: INTERNAL MEDICINE

## 2024-10-14 PROCEDURE — 76000 FLUOROSCOPY <1 HR PHYS/QHP: CPT | Performed by: STUDENT IN AN ORGANIZED HEALTH CARE EDUCATION/TRAINING PROGRAM

## 2024-10-14 PROCEDURE — 0QSH36Z REPOSITION LEFT TIBIA WITH INTRAMEDULLARY INTERNAL FIXATION DEVICE, PERCUTANEOUS APPROACH: ICD-10-PCS | Performed by: STUDENT IN AN ORGANIZED HEALTH CARE EDUCATION/TRAINING PROGRAM

## 2024-10-14 DEVICE — ADVANCED LOCKING SCREW: Type: IMPLANTABLE DEVICE | Site: TIBIA | Status: FUNCTIONAL

## 2024-10-14 DEVICE — CANNULATED SCREW
Type: IMPLANTABLE DEVICE | Site: TIBIA | Status: FUNCTIONAL
Brand: ASNIS

## 2024-10-14 DEVICE — LOCKING SCREW
Type: IMPLANTABLE DEVICE | Site: TIBIA | Status: FUNCTIONAL
Brand: T2 ALPHA

## 2024-10-14 DEVICE — WASHER: Type: IMPLANTABLE DEVICE | Site: TIBIA | Status: FUNCTIONAL

## 2024-10-14 DEVICE — TIBIAL NAIL
Type: IMPLANTABLE DEVICE | Site: TIBIA | Status: FUNCTIONAL
Brand: T2 ALPHA

## 2024-10-14 RX ORDER — DEXAMETHASONE SODIUM PHOSPHATE 4 MG/ML
VIAL (ML) INJECTION AS NEEDED
Status: DISCONTINUED | OUTPATIENT
Start: 2024-10-14 | End: 2024-10-14 | Stop reason: SURG

## 2024-10-14 RX ORDER — SODIUM CHLORIDE, SODIUM LACTATE, POTASSIUM CHLORIDE, CALCIUM CHLORIDE 600; 310; 30; 20 MG/100ML; MG/100ML; MG/100ML; MG/100ML
INJECTION, SOLUTION INTRAVENOUS CONTINUOUS
Status: DISCONTINUED | OUTPATIENT
Start: 2024-10-14 | End: 2024-10-14 | Stop reason: HOSPADM

## 2024-10-14 RX ORDER — DOCUSATE SODIUM 100 MG/1
100 CAPSULE, LIQUID FILLED ORAL 2 TIMES DAILY
Status: DISCONTINUED | OUTPATIENT
Start: 2024-10-14 | End: 2024-10-17

## 2024-10-14 RX ORDER — ASPIRIN 81 MG/1
81 TABLET ORAL 2 TIMES DAILY
Status: DISCONTINUED | OUTPATIENT
Start: 2024-10-14 | End: 2024-10-17

## 2024-10-14 RX ORDER — ACETAMINOPHEN 500 MG
1000 TABLET ORAL EVERY 8 HOURS SCHEDULED
Status: DISCONTINUED | OUTPATIENT
Start: 2024-10-14 | End: 2024-10-17

## 2024-10-14 RX ORDER — ONDANSETRON 2 MG/ML
INJECTION INTRAMUSCULAR; INTRAVENOUS AS NEEDED
Status: DISCONTINUED | OUTPATIENT
Start: 2024-10-14 | End: 2024-10-14 | Stop reason: SURG

## 2024-10-14 RX ORDER — HYDROMORPHONE HYDROCHLORIDE 1 MG/ML
0.4 INJECTION, SOLUTION INTRAMUSCULAR; INTRAVENOUS; SUBCUTANEOUS EVERY 2 HOUR PRN
Status: DISCONTINUED | OUTPATIENT
Start: 2024-10-14 | End: 2024-10-17

## 2024-10-14 RX ORDER — BISACODYL 10 MG
10 SUPPOSITORY, RECTAL RECTAL
Status: DISCONTINUED | OUTPATIENT
Start: 2024-10-14 | End: 2024-10-17

## 2024-10-14 RX ORDER — ONDANSETRON 2 MG/ML
4 INJECTION INTRAMUSCULAR; INTRAVENOUS EVERY 6 HOURS PRN
Status: DISCONTINUED | OUTPATIENT
Start: 2024-10-14 | End: 2024-10-17

## 2024-10-14 RX ORDER — HYDROMORPHONE HYDROCHLORIDE 1 MG/ML
0.2 INJECTION, SOLUTION INTRAMUSCULAR; INTRAVENOUS; SUBCUTANEOUS EVERY 2 HOUR PRN
Status: DISCONTINUED | OUTPATIENT
Start: 2024-10-14 | End: 2024-10-17

## 2024-10-14 RX ORDER — SODIUM CHLORIDE, SODIUM LACTATE, POTASSIUM CHLORIDE, CALCIUM CHLORIDE 600; 310; 30; 20 MG/100ML; MG/100ML; MG/100ML; MG/100ML
INJECTION, SOLUTION INTRAVENOUS CONTINUOUS PRN
Status: DISCONTINUED | OUTPATIENT
Start: 2024-10-14 | End: 2024-10-14 | Stop reason: SURG

## 2024-10-14 RX ORDER — LIDOCAINE HYDROCHLORIDE 10 MG/ML
INJECTION, SOLUTION EPIDURAL; INFILTRATION; INTRACAUDAL; PERINEURAL AS NEEDED
Status: DISCONTINUED | OUTPATIENT
Start: 2024-10-14 | End: 2024-10-14 | Stop reason: SURG

## 2024-10-14 RX ORDER — HYDROMORPHONE HYDROCHLORIDE 1 MG/ML
0.6 INJECTION, SOLUTION INTRAMUSCULAR; INTRAVENOUS; SUBCUTANEOUS EVERY 5 MIN PRN
Status: DISCONTINUED | OUTPATIENT
Start: 2024-10-14 | End: 2024-10-14 | Stop reason: HOSPADM

## 2024-10-14 RX ORDER — MORPHINE SULFATE 4 MG/ML
4 INJECTION, SOLUTION INTRAMUSCULAR; INTRAVENOUS EVERY 10 MIN PRN
Status: DISCONTINUED | OUTPATIENT
Start: 2024-10-14 | End: 2024-10-14 | Stop reason: HOSPADM

## 2024-10-14 RX ORDER — ACETAMINOPHEN 10 MG/ML
INJECTION, SOLUTION INTRAVENOUS AS NEEDED
Status: DISCONTINUED | OUTPATIENT
Start: 2024-10-14 | End: 2024-10-14 | Stop reason: SURG

## 2024-10-14 RX ORDER — PROCHLORPERAZINE EDISYLATE 5 MG/ML
5 INJECTION INTRAMUSCULAR; INTRAVENOUS EVERY 8 HOURS PRN
Status: DISCONTINUED | OUTPATIENT
Start: 2024-10-14 | End: 2024-10-17

## 2024-10-14 RX ORDER — MIDAZOLAM HYDROCHLORIDE 1 MG/ML
INJECTION INTRAMUSCULAR; INTRAVENOUS AS NEEDED
Status: DISCONTINUED | OUTPATIENT
Start: 2024-10-14 | End: 2024-10-14 | Stop reason: SURG

## 2024-10-14 RX ORDER — NAPROXEN 500 MG/1
500 TABLET ORAL 2 TIMES DAILY WITH MEALS
Status: DISCONTINUED | OUTPATIENT
Start: 2024-10-15 | End: 2024-10-17

## 2024-10-14 RX ORDER — DOXEPIN HYDROCHLORIDE 50 MG/1
1 CAPSULE ORAL DAILY
Status: DISCONTINUED | OUTPATIENT
Start: 2024-10-15 | End: 2024-10-17

## 2024-10-14 RX ORDER — POLYETHYLENE GLYCOL 3350 17 G/17G
17 POWDER, FOR SOLUTION ORAL DAILY PRN
Status: DISCONTINUED | OUTPATIENT
Start: 2024-10-14 | End: 2024-10-17

## 2024-10-14 RX ORDER — MORPHINE SULFATE 10 MG/ML
6 INJECTION, SOLUTION INTRAMUSCULAR; INTRAVENOUS EVERY 10 MIN PRN
Status: DISCONTINUED | OUTPATIENT
Start: 2024-10-14 | End: 2024-10-14 | Stop reason: HOSPADM

## 2024-10-14 RX ORDER — SENNOSIDES 8.6 MG
17.2 TABLET ORAL NIGHTLY
Status: DISCONTINUED | OUTPATIENT
Start: 2024-10-14 | End: 2024-10-17

## 2024-10-14 RX ORDER — HYDROMORPHONE HYDROCHLORIDE 1 MG/ML
0.2 INJECTION, SOLUTION INTRAMUSCULAR; INTRAVENOUS; SUBCUTANEOUS EVERY 5 MIN PRN
Status: DISCONTINUED | OUTPATIENT
Start: 2024-10-14 | End: 2024-10-14 | Stop reason: HOSPADM

## 2024-10-14 RX ORDER — NALOXONE HYDROCHLORIDE 0.4 MG/ML
0.08 INJECTION, SOLUTION INTRAMUSCULAR; INTRAVENOUS; SUBCUTANEOUS AS NEEDED
Status: DISCONTINUED | OUTPATIENT
Start: 2024-10-14 | End: 2024-10-14 | Stop reason: HOSPADM

## 2024-10-14 RX ORDER — KETOROLAC TROMETHAMINE 30 MG/ML
30 INJECTION, SOLUTION INTRAMUSCULAR; INTRAVENOUS EVERY 6 HOURS
Status: COMPLETED | OUTPATIENT
Start: 2024-10-14 | End: 2024-10-15

## 2024-10-14 RX ORDER — OXYCODONE HYDROCHLORIDE 5 MG/1
5 TABLET ORAL EVERY 4 HOURS PRN
Status: DISCONTINUED | OUTPATIENT
Start: 2024-10-14 | End: 2024-10-15

## 2024-10-14 RX ORDER — SODIUM PHOSPHATE, DIBASIC AND SODIUM PHOSPHATE, MONOBASIC 7; 19 G/230ML; G/230ML
1 ENEMA RECTAL ONCE AS NEEDED
Status: DISCONTINUED | OUTPATIENT
Start: 2024-10-14 | End: 2024-10-17

## 2024-10-14 RX ORDER — OXYCODONE HYDROCHLORIDE 5 MG/1
2.5 TABLET ORAL EVERY 4 HOURS PRN
Status: DISCONTINUED | OUTPATIENT
Start: 2024-10-14 | End: 2024-10-15

## 2024-10-14 RX ORDER — TRAMADOL HYDROCHLORIDE 50 MG/1
50 TABLET ORAL EVERY 6 HOURS SCHEDULED
Status: DISCONTINUED | OUTPATIENT
Start: 2024-10-14 | End: 2024-10-17

## 2024-10-14 RX ORDER — MORPHINE SULFATE 4 MG/ML
2 INJECTION, SOLUTION INTRAMUSCULAR; INTRAVENOUS EVERY 10 MIN PRN
Status: DISCONTINUED | OUTPATIENT
Start: 2024-10-14 | End: 2024-10-14 | Stop reason: HOSPADM

## 2024-10-14 RX ORDER — ROCURONIUM BROMIDE 10 MG/ML
INJECTION, SOLUTION INTRAVENOUS AS NEEDED
Status: DISCONTINUED | OUTPATIENT
Start: 2024-10-14 | End: 2024-10-14 | Stop reason: SURG

## 2024-10-14 RX ORDER — HYDROMORPHONE HYDROCHLORIDE 1 MG/ML
0.4 INJECTION, SOLUTION INTRAMUSCULAR; INTRAVENOUS; SUBCUTANEOUS EVERY 5 MIN PRN
Status: DISCONTINUED | OUTPATIENT
Start: 2024-10-14 | End: 2024-10-14 | Stop reason: HOSPADM

## 2024-10-14 RX ADMIN — LIDOCAINE HYDROCHLORIDE 50 MG: 10 INJECTION, SOLUTION EPIDURAL; INFILTRATION; INTRACAUDAL; PERINEURAL at 15:53:00

## 2024-10-14 RX ADMIN — SODIUM CHLORIDE, SODIUM LACTATE, POTASSIUM CHLORIDE, CALCIUM CHLORIDE: 600; 310; 30; 20 INJECTION, SOLUTION INTRAVENOUS at 18:29:00

## 2024-10-14 RX ADMIN — MIDAZOLAM HYDROCHLORIDE 2 MG: 1 INJECTION INTRAMUSCULAR; INTRAVENOUS at 15:46:00

## 2024-10-14 RX ADMIN — SODIUM CHLORIDE, SODIUM LACTATE, POTASSIUM CHLORIDE, CALCIUM CHLORIDE: 600; 310; 30; 20 INJECTION, SOLUTION INTRAVENOUS at 15:52:00

## 2024-10-14 RX ADMIN — ONDANSETRON 4 MG: 2 INJECTION INTRAMUSCULAR; INTRAVENOUS at 15:56:00

## 2024-10-14 RX ADMIN — ACETAMINOPHEN 1000 MG: 10 INJECTION, SOLUTION INTRAVENOUS at 18:20:00

## 2024-10-14 RX ADMIN — DEXAMETHASONE SODIUM PHOSPHATE 4 MG: 4 MG/ML VIAL (ML) INJECTION at 15:56:00

## 2024-10-14 RX ADMIN — ROCURONIUM BROMIDE 70 MG: 10 INJECTION, SOLUTION INTRAVENOUS at 15:54:00

## 2024-10-14 NOTE — PROGRESS NOTES
Progress Note     Dann Toro Patient Status:  Inpatient    2000 MRN [id number]   Location [facility] OPERATING ROOM Attending Julian Bhat MD   Hosp Day # 3 PCP No primary care provider on file.     Chief Complaint:   Chief Complaint   Patient presents with    Leg or Foot Injury         Subjective:   S: Patient seen and examined, chart reviewed.   In or today      Review of Systems:   10 point ROS completed and was negative, except for pertinent positive and negatives stated in subjective.                Objective:   Vital signs:  Temp:  [97.8 °F (36.6 °C)-98.7 °F (37.1 °C)] 97.8 °F (36.6 °C)  Pulse:  [52-67] 67  Resp:  [16-20] 18  BP: (137-168)/(55-61) 168/57  SpO2:  [99 %-100 %] 100 %    Wt Readings from Last 6 Encounters:   10/14/24 186 lb (84.4 kg)       Physical Exam:    General: No acute distress.   Respiratory: Clear to auscultation bilaterally. No wheezes. No rhonchi.  Cardiovascular: S1, S2. Regular rate and rhythm. No murmurs, rubs or gallops.   Abdomen: Soft, nontender, nondistended.  Positive bowel sounds. No rebound or guarding.  Neurologic: No focal neurological deficits.   Musculoskeletal: Moves all extremities.  Extremities: No edema.    Results:   Diagnostic Data:      Labs:    Labs Last 24 Hours:   BMP     CBC    Other     Na - Cl - BUN - Glu -   Hb -   PTT - Procal -   K - CO2 - Cr -   WBC - >< PLT -  INR - CRP -   Renal Lytes Endo    Hct -   Trop - D dim -   eGFR - Ca - POC Gluc  -    LFT   pBNP - Lactic -   eGFR AA - PO4 - A1c -   AST - APk - Prot -  LDL -      Recent Labs   Lab 10/11/24  2223   RBC 4.98   HGB 15.9   HCT 45.9   MCV 92.2   MCH 31.9   MCHC 34.6   RDW 12.7   NEPRELIM 8.61*   WBC 11.1*   .0       No results found for: \"PT\", \"INR\"    Recent Labs   Lab 10/11/24  2223 10/12/24  0546 10/13/24  0559   GLU 82 81 107*   BUN 13 13 14   CREATSERUM 0.88 1.12 1.08   CA 7.0* 8.9 8.6*    142 140   K 2.9* 4.4 4.3   * 107 106   CO2 19.0* 28.0 29.0        No results for input(s): \"MARTIR\", \"LIP\" in the last 168 hours.    Recent Labs   Lab 10/13/24  0559   MG 2.2       No results for input(s): \"URINE\", \"CULTI\", \"BLDSMR\" in the last 168 hours.      XR KNEE, COMPLETE (4 OR MORE VIEWS), LEFT (CPT=73564)    Result Date: 10/14/2024  CONCLUSION:  1. No acute fracture or dislocation, left knee. 2. Mild left knee joint effusion.    Dictated by (CST): Fritz Corrigan MD on 10/14/2024 at 12:57 PM     Finalized by (CST): Fritz Corrigan MD on 10/14/2024 at 1:00 PM               Pro-Calcitonin  No results for input(s): \"PCT\" in the last 168 hours.    Cardiac  No results for input(s): \"TROP\", \"PBNP\" in the last 168 hours.    Imaging: Imaging data reviewed in Epic.    XR KNEE, COMPLETE (4 OR MORE VIEWS), LEFT (CPT=73564)    Result Date: 10/14/2024  CONCLUSION:  1. No acute fracture or dislocation, left knee. 2. Mild left knee joint effusion.    Dictated by (CST): Fritz Corrigan MD on 10/14/2024 at 12:57 PM     Finalized by (CST): Fritz Corrigan MD on 10/14/2024 at 1:00 PM              Medications:     Assessment & Plan:   ASSESSMENT / PLAN:     Left tibia/ankle fracture  -N.p.o. now.  surgery today  -Pain control with IV dilaudid, has been working better than morphine.   -Orthopedic consult.    -Nonweightbearing     Hypokalemia - replaced  -Replace per protocol     Mild leukocytosis, likely reactive     HTN, likely pain induced  -Optimize pain control  -Improved     Prophylaxis:   DVT with scd for now and post op per ortho      dvt prophylaxis: sc lovenox  code status: full code  dispo: home upon medical clearance    >55min spent, >50% spent counseling and coordinating care in the form of educating pt/family and d/w consultants and staff. Most of the time spent discussing the above plan.     Julian Bhat MD, FAAP, FACP  [facility] Hospitalist  I respond to [employer] Chat and AMS Connect

## 2024-10-14 NOTE — ANESTHESIA POSTPROCEDURE EVALUATION
Patient: Dann Toro    Procedure Summary       Date: 10/14/24 Room / Location: [facility] MAIN OR  / [facility] MAIN OR    Anesthesia Start: 1546 Anesthesia Stop: 1833    Procedure: INTRAMEDULLARY NAIL FIXATION LEFT TIBIA UNDER FLUOROSCOPIC GUIDANCE (Left: Lower Leg) Diagnosis:       Tibia fracture      (Tibia fracture [S82.209A])    Surgeons: Behery, Omar Atef, MD Anesthesiologist: Yemi Hale MD, PhD    Anesthesia Type: general ASA Status: 1            Anesthesia Type: general    Vitals Value Taken Time   /87 10/14/24 1830   Temp 97.3 10/14/24 1833   Pulse 120 10/14/24 1833   Resp 29 10/14/24 1833   SpO2 96 % 10/14/24 1833   Vitals shown include unfiled device data.    [facility] AN Post Evaluation:   Patient Evaluated in PACU  Patient Participation: complete - patient participated  Level of Consciousness: awake and alert  Pain Score: 2  Pain Management: adequate  Airway Patency:patent  Yes    Nausea/Vomiting: none  Cardiovascular Status: acceptable  Respiratory Status: acceptable  Postoperative Hydration acceptable      Yemi Hale MD, PhD  10/14/2024 6:33 PM

## 2024-10-14 NOTE — CONSULTS
[facility]   ORTHOPAEDIC CONSULT NOTE    HOSPITAL: [place]    CONSULT REQUEST BY: Dr. Ari Akbar    CHIEF COMPLAINT/REASON FOR VISIT  Left lower leg pain     HISTORY OF PRESENT ILLNESS  Dann Toro is a 24 year old male, prior history of migraines, who presents with left lower leg pain and deformity after collision during a hockey game. Taken to the ER at [facility], where imaging demonstrated a distal tibial shaft fracture and an ortho consult was requested.Evaluated by Dr. Akbar, and my assistance in his surgical care was requested.    He experienced immediate 10/10, sharp pain in the mid shft, radiating into the ankle. No associated head trauma or trauma elsewhere. Denies pain elsewhere. No associated new onset numbness/tingling in the affected extremity.     for [place]       Review of Systems: Complete 10 point review of systems was performed and negative except for above in HPI.     PMH  Past Medical History:    Migraine        PSH  History reviewed. No pertinent surgical history.    FAMILY HISTORY  No family history on file.     SOCIAL HISTORY  Social History     Socioeconomic History    Marital status:      Spouse name: Not on file    Number of children: Not on file    Years of education: Not on file    Highest education level: Not on file   Occupational History    Not on file   Tobacco Use    Smoking status: Never    Smokeless tobacco: Never   Substance and Sexual Activity    Alcohol use: Not Currently    Drug use: Never    Sexual activity: Not on file   Other Topics Concern    Not on file   Social History Narrative    Not on file     Social Drivers of Health     Financial Resource Strain: Not on file   Food Insecurity: No Food Insecurity (10/12/2024)    Food Insecurity     Food Insecurity: Never true   Transportation Needs: No Transportation Needs (10/12/2024)    Transportation Needs     Lack of Transportation: No     Car Seat: Not on file    Physical Activity: Not on file   Stress: Not on file   Social Connections: Not on file   Housing Stability: Low Risk  (10/12/2024)    Housing Stability     Housing Instability: No     Housing Instability Emergency: Not on file     Crib or Bassinette: Not on file       MEDS  Medications Ordered Prior to Encounter[1]    ALLERGIES  Allergies[2]     PHYSICAL EXAM  BMI:  Body mass index is 27.51 kg/m².  Constitutional:  The patient is alert and awake in no apparent distress.  Psychiatric: Affect appears normal. Conversational.  Lymphatic: There is no lymphedema in the lower extremity  Lungs:  The patient's breathing is unlabored.  Heart: Regular heart rate  Abdomen Nondistended, Nontender    Splint uncovered over LLE   Skin:  There are no prior incisions, scars, open wounds or lesions over the left lower leg.      Musculoskeletal:    Moderate soft tissue swelling, compartments soft and compressible throughout the lower leg.  There is severe tenderness to palpation over the mid tibia.    Pain in the tibia is not reproduced or significant with passive stretch of the toes/ankle..     Neuro: The patient has is able to fire EHL, and FHL.    Sensation is intact to light touch in the ipsilateral superficial peroneal, deep peroneal, saphenous, sural, and tibial nerve distributions.    Vascular exam: Toes are warm and well perfused. Capillary refill 2s. DP pulse is palpable.     IMAGING  I personally reviewed and interpreted the patient's imaging. Review of the knee, tibia and ankle x-rays reveal a displaced spiral distal tibial shaft fracture.   CT scan reveals a nondisplaced posterior malleolar fracture.       ASSESSMENT:  24 year old man, who presents with a closed displaced left tibial shaft fracture with associated nondisplaced posterior malleolar fracture.     PLAN:  I discussed the diagnosis with the patient and their family, as well as the treatment options of operative vs nonoperative treatment. Together we decided it  is best to proceed with operative intervention with the goals of pain control and early mobilization. Risks, benefits, and alternatives including bleeding, infection, damage to surrounding structures including vessels, nerves, ligaments, and tendons, fracture, dislocation, leg length discrepancy, and medical and anesthetic complications were discussed with the patient. Informed consent obtained.   Will plan for operative intervention on 10/14/2024 if medically optimized.   NPO / IVF   Weight bearing status: Non weight bearing left LE.  DVT chemoprophylaxis ASA 81 BID  Multimodal pain control  Dispo: pending operative management.       Omar Behery, MD  [facility]        [1]   No current facility-administered medications on file prior to encounter.     Current Outpatient Medications on File Prior to Encounter   Medication Sig Dispense Refill    SUMAtriptan Succinate 100 MG Oral Tab Take 1 tablet (100 mg total) by mouth every 6 (six) hours as needed for Migraine.     [2] No Known Allergies

## 2024-10-14 NOTE — ANESTHESIA PREPROCEDURE EVALUATION
Anesthesia PreOp Note    HPI:     Dann Toro is a 24 year old male who presents for preoperative consultation requested by: Behery, Omar Atef, MD    Date of Surgery: 10/11/2024 - 10/14/2024    Procedure(s):  INTRAMEDULLARY NAIL FIXATION LEFT TIBIA UNDER FLUOROSCOPIC GUIDANCE  Indication: Tibia fracture [S82.209A]    Relevant Problems   No relevant active problems       NPO:  Last Liquid Consumption Date: 10/14/24  Last Liquid Consumption Time: 0745  Last Solid Consumption Date: 10/14/24  Last Solid Consumption Time: 0745  Last Liquid Consumption Date: 10/14/24          History Review:  Patient Active Problem List    Diagnosis Date Noted    Tibia fracture 10/11/2024    Closed fracture of distal end of left tibia, unspecified fracture morphology, initial encounter 10/11/2024    Closed fracture of posterior malleolus of left tibia, initial encounter 10/11/2024       Past Medical History:    Migraine       History reviewed. No pertinent surgical history.    Prescriptions Prior to Admission[1]  Current Medications and Prescriptions Ordered in Epic[2]    Allergies[3]    No family history on file.  Social History     Socioeconomic History    Marital status:    Tobacco Use    Smoking status: Never    Smokeless tobacco: Never   Substance and Sexual Activity    Alcohol use: Not Currently    Drug use: Never       Available pre-op labs reviewed.  Lab Results   Component Value Date    WBC 11.1 (H) 10/11/2024    RBC 4.98 10/11/2024    HGB 15.9 10/11/2024    HCT 45.9 10/11/2024    MCV 92.2 10/11/2024    MCH 31.9 10/11/2024    MCHC 34.6 10/11/2024    RDW 12.7 10/11/2024    .0 10/11/2024     Lab Results   Component Value Date     10/13/2024    K 4.3 10/13/2024     10/13/2024    CO2 29.0 10/13/2024    BUN 14 10/13/2024    CREATSERUM 1.08 10/13/2024     (H) 10/13/2024    CA 8.6 (L) 10/13/2024          Vital Signs:  Body mass index is 27.47 kg/m².   height is 1.753 m (5' 9\") and weight is 84.4 kg  (186 lb). His oral temperature is 97.8 °F (36.6 °C). His blood pressure is 168/57 (abnormal) and his pulse is 67. His respiration is 18 and oxygen saturation is 100%.   Vitals:    10/13/24 2233 10/14/24 0355 10/14/24 0735 10/14/24 1429   BP: 145/60 146/61 137/59 (!) 168/57   Pulse:  61 52 67   Resp:  20 18    Temp:  98.7 °F (37.1 °C) 97.8 °F (36.6 °C)    TempSrc:  Temporal Oral    SpO2:  99% 100% 100%   Weight:    84.4 kg (186 lb)   Height:    1.753 m (5' 9\")        Anesthesia Evaluation     Patient summary reviewed and Nursing notes reviewed    No history of anesthetic complications   Airway   Mallampati: I  TM distance: >3 FB  Neck ROM: full  Dental      Pulmonary - negative ROS and normal exam   Cardiovascular - negative ROS and normal exam    Neuro/Psych - negative ROS     GI/Hepatic/Renal - negative ROS     Endo/Other - negative ROS   Abdominal                  Anesthesia Plan:   ASA:  1  Plan:   General  Informed Consent Plan and Risks Discussed With:  Patient      I have informed aDnn Toro and/or legal guardian or family member of the nature of the anesthetic plan, benefits, risks including possible dental damage if relevant, major complications, and any alternative forms of anesthetic management.   All of the patient's questions were answered to the best of my ability. The patient desires the anesthetic management as planned.  Juan Jenkins MD  10/14/2024 3:03 PM  Present on Admission:  **None**           [1]   Medications Prior to Admission   Medication Sig Dispense Refill Last Dose/Taking    SUMAtriptan Succinate 100 MG Oral Tab Take 1 tablet (100 mg total) by mouth every 6 (six) hours as needed for Migraine.   Taking As Needed   [2]   Current Facility-Administered Medications Ordered in Epic   Medication Dose Route Frequency Provider Last Rate Last Admin    [Transfer Hold] HYDROmorphone (Dilaudid) 1 MG/ML injection 0.4 mg  0.4 mg Intravenous Q2H PRN Julian Bhat MD   0.4 mg at 10/13/24  2358    Or    [Transfer Hold] HYDROmorphone (Dilaudid) 1 MG/ML injection 0.8 mg  0.8 mg Intravenous Q2H PRN Julian Bhat MD   0.8 mg at 10/14/24 0857    Or    [Transfer Hold] HYDROmorphone (Dilaudid) 1 MG/ML injection 1.2 mg  1.2 mg Intravenous Q2H PRN Julian Bhat MD   1.2 mg at 10/14/24 1323    sodium chloride 0.9% infusion   Intravenous Continuous Michelle Chapman MD        [Transfer Hold] HYDROcodone-acetaminophen (Norco)  MG per tab 1 tablet  1 tablet Oral Q4H PRN Michelle Chapman MD   1 tablet at 10/14/24 0805    [Transfer Hold] acetaminophen (Tylenol Extra Strength) tab 500 mg  500 mg Oral Q4H PRN Teagan Barkley MD        [Transfer Hold] melatonin tab 3 mg  3 mg Oral Nightly PRN Teagan Barkley MD        [Transfer Hold] ondansetron (Zofran) 4 MG/2ML injection 4 mg  4 mg Intravenous Q6H PRN Teagan Barkley MD   4 mg at 10/13/24 0005    [Transfer Hold] prochlorperazine (Compazine) 10 MG/2ML injection 5 mg  5 mg Intravenous Q8H PRN Teagan Barkley MD         No current Epic-ordered outpatient medications on file.   [3] No Known Allergies

## 2024-10-14 NOTE — PLAN OF CARE
Pt alert and oriented x4. On room air. Pain managed with prn dilaudid. Down for Tibia IM nailing with Dr. Behery     Problem: Patient Centered Care  Goal: Patient preferences are identified and integrated in the patient's plan of care  Description: Interventions:  - Provide timely, complete, and accurate information to patient/family  - Incorporate patient and family knowledge, values, beliefs, and cultural backgrounds into the planning and delivery of care  - Encourage patient/family to participate in care and decision-making at the level they choose  - Honor patient and family perspectives and choices  Outcome: Progressing    Problem: PAIN - ADULT  Goal: Verbalizes/displays adequate comfort level or patient's stated pain goal  Description: INTERVENTIONS:  - Encourage pt to monitor pain and request assistance  - Assess pain using appropriate pain scale  - Administer analgesics based on type and severity of pain and evaluate response  - Implement non-pharmacological measures as appropriate and evaluate response  - Consider cultural and social influences on pain and pain management  - Manage/alleviate anxiety  - Utilize distraction and/or relaxation techniques  - Monitor for opioid side effects  - Notify MD/LIP if interventions unsuccessful or patient reports new pain  - Anticipate increased pain with activity and pre-medicate as appropriate  Outcome: Progressing     Problem: SAFETY ADULT - FALL  Goal: Free from fall injury  Description: INTERVENTIONS:  - Assess pt frequently for physical needs  - Identify cognitive and physical deficits and behaviors that affect risk of falls.  - [place] fall precautions as indicated by assessment.  - Educate pt/family on patient safety including physical limitations  - Instruct pt to call for assistance with activity based on assessment  - Modify environment to reduce risk of injury  - Provide assistive devices as appropriate  - Consider OT/PT consult to assist with  strengthening/mobility  - Encourage toileting schedule  Outcome: Progressing

## 2024-10-14 NOTE — PROGRESS NOTES
Plan for fixation tomorrow evening, patient may have breakfast, NPO after 8 AM, no evidence of compartment syndrome.

## 2024-10-14 NOTE — ANESTHESIA PROCEDURE NOTES
Airway  Date/Time: 10/14/2024 3:55 PM  Urgency: Elective      General Information and Staff    Patient location during procedure: OR  Resident/CRNA: Sarah Vela CRNA  Performed: CRNA   Performed by: Sarah Vela CRNA  Authorized by: Juan Jenkins MD      Indications and Patient Condition  Indications for airway management: anesthesia  Sedation level: deep  Preoxygenated: yes  Patient position: sniffing  Mask difficulty assessment: 1 - vent by mask    Final Airway Details  Final airway type: endotracheal airway      Successful airway: ETT  Cuffed: yes   Successful intubation technique: Video laryngoscopy  Endotracheal tube insertion site: oral  Blade size: #4  ETT size (mm): 7.5    Cormack-Lehane Classification: grade I - full view of glottis  Placement verified by: capnometry   Cuff volume (mL): 8  Measured from: teeth  Number of attempts at approach: 1

## 2024-10-15 LAB
HCT VFR BLD AUTO: 37.8 %
HGB BLD-MCNC: 12.9 G/DL

## 2024-10-15 PROCEDURE — 99233 SBSQ HOSP IP/OBS HIGH 50: CPT | Performed by: INTERNAL MEDICINE

## 2024-10-15 RX ORDER — OXYCODONE HYDROCHLORIDE 5 MG/1
10 TABLET ORAL EVERY 4 HOURS PRN
Status: DISCONTINUED | OUTPATIENT
Start: 2024-10-15 | End: 2024-10-17

## 2024-10-15 RX ORDER — OXYCODONE HYDROCHLORIDE 5 MG/1
5 TABLET ORAL EVERY 4 HOURS PRN
Status: DISCONTINUED | OUTPATIENT
Start: 2024-10-15 | End: 2024-10-17

## 2024-10-15 RX ORDER — ACETAMINOPHEN 500 MG
1000 TABLET ORAL ONCE
Status: COMPLETED | OUTPATIENT
Start: 2024-10-15 | End: 2024-10-15

## 2024-10-15 NOTE — DISCHARGE INSTRUCTIONS
DISCHARGE INSTRUCTIONS:  PLACE ICE TO SURGICAL AREA 20-30 MINUTES ON/ 20-30 MINUTES OFF. THIS IS TO HELP WITH PAIN & SWELLING.    TAKE YOUR MEDICATIONS BELOW AS PRESCRIBED BY YOUR DOCTOR. THESE HAVE BEEN SENT TO YOUR PHARMACY.    DO NOT BEAR ANY WEIGHT ON THE OPERATIVE EXTREMITY. USE CRUTCHES TO AMBULATE AT ALL TIMES. ELEVATE THE LEG ABOVE HEART LEVEL FREQUENTLY THROUGHOUT THE DAY TO MINIMIZE PAIN AND SWELLING.    CALL TO CONFIRM YOUR FOLLOW UP APPOINTMENT WITH DR. BEHERY TO BE SEEN IN 2-3 WEEKS POSTOP. [phone]    MEDICATIONS:    POST OP MEDICATION REGIMEN              MULTI-MODAL   PAIN REGIMEN       DRUG   FOR   FREQUENCY & DURATION   QTY   NOTES     WEANING  TIPS       Gabapentin 100mg   Nerve pain   Take 2 tabs every 8 hours for 14 days    90   No refills You may discontinue this medication prior to 14 days if you do not tolerate it.        Tylenol 500mg     Mild pain   Take 2 tabs every 6 hours     90   Can purchase over-the-counter    Stop using medication if no longer having pain.      Tramadol 50mg     Moderate pain   Take 1-2 tabs every 6 hours only as needed   45 May prescribe a refill, but ideally, this should be tapered off after surgery Stop using this medication 2nd   As pain decreases over time, increase the interval between doses (6 hours to 8, then 12, then 24) to taper off the medication.      Meloxicam 15mg     Inflammation   Take 1 tab daily for 30 days     30   May refill if needed beyond 30 days   Recommend completing the 30 day supply.           BREAKTHROUGH PAIN         Oxycodone 5mg       Severe pain     Take 1-2 tabs every 4-6 hours only as needed for severe pain       40   Take at least 1 hr apart from Tramadol.    Ideally, no refill. The goal is to taper off this medication as soon as possible, as it can be addictive and have side effects.    Stop using this medication 1st if no longer having severe pain.         BLOOD THINNER     Aspirin 81mg   Blood clot prevention   Take 1 tab  twice daily for 30 days     60     No refills   Complete the entire course of your blood thinner.                      STOOL SOFTENER     Sennokot 8.6/50mg   Constipation   Take 1 tab twice daily  while on opioids     60 Refer to discharge instructions if constipation persists even after taking this medication   Stop taking if having diarrhea or loose stools.           ANTI-NAUSEA   Ondansetron 4mg   Nausea   Take 1 tab every 8 hours as needed if nauseous     20   No Refill      ANTI-ACID REFLUX / GASTRITIS   Pantoprazole 40mg   Acid reflux, gastric ulcer prophylaxis   Take 1 tab every day along with Meloxicam     60   May refill if Meloxicam refilled          DRESSING:    KEEP YOUR SPLINT CLEAN AND DRY AT ALL TIMES AND DO NOT GET IT WET    YOU MAY SHOWER AS SOON AS YOU RETURN HOME BUT USE AN ADEQUATE CAST BAG OVER THE LEG TO PREVENT ANY MOISTURE OR WATER GETTING INTO THE SPLINT    IF DRAINAGE IS PRESENT AT ANYTIME FROM YOUR DRESSING OR FROM YOUR INCISION CALL YOUR DOCTOR / SURGEON AS SOON AS POSSIBLE.      REASONS TO CALL YOUR DOCTOR:  TEMPERATURE .0 OR MORE  PERSISTANT NAUSEA OR VOMITTING - ESPECIALLY IF YOU ARE UNABLE TO EAT OR DRINK.  INCREASED LEVEL OF PAIN OR PAIN WHICH IS NOT CONTROLLED BY YOUR PAIN MEDICINE.  PERSISTENT DRAINAGE AT ANYTIME FROM YOUR SURGICAL AREA / INCISION.  PAIN, TENDERNESS OR SUDDEN SWELLING IN YOUR CALF REGION OF THE LOWER EXTREMITIES - THIS IS A POSSIBLE SIGN OF A BLOOD CLOT.  ANY CHANGES IN SENSATION IN YOUR BODY IN THE AREA OF YOUR SURGERY = NUMBNESS OR TINGLING.  ANY CHANGES IN CIRCULATION IN YOUR BODY IN THE AREA OF YOUR SURGERY = CHANGES  IN COLOR EITHER DARKER OR VERY PALE; OR  IF AREA FEELS COLD TO THE TOUCH AS COMPARED TO OTHER AREAS.  ANY CHANGES IN FUNCTION IN YOUR BODY IN THE AREA OF YOUR SURGERY = CHANGE IN MOVEMENT - UNABLE TO MOVE OR WIGGLE FINGERS, TOES OR FOOT OR ANY OTHER CHANGES IN NORMAL BODY FUNCTIONING.  FOR ANY OF THE ABOVE OR ANY OTHER QUESTIONS OR CONCERNS  CALL YOUR DOCTOR/ SURGEON AS SOON AS POSSIBLE.    Omar Behery, MD MPH  Orthopaedic Surgeon, Adult Hip and Knee Reconstruction  [facility] at [address], [address] [address]  Office: (P) [phone] (F) [phone]  Adminstrative Assistant: Emily Sanches

## 2024-10-15 NOTE — PROGRESS NOTES
Progress Note     Dann Toro Patient Status:  Inpatient    2000 MRN [id number]   Location [facility] 4W/SW/SE Attending Julian Bhat MD   Hosp Day # 4 PCP No primary care provider on file.     Chief Complaint:   Chief Complaint   Patient presents with    Leg or Foot Injury         Subjective:   S: Patient seen and examined, chart reviewed.   Has pain post op      Review of Systems:   10 point ROS completed and was negative, except for pertinent positive and negatives stated in subjective.                Objective:   Vital signs:  Temp:  [98.2 °F (36.8 °C)-98.7 °F (37.1 °C)] 98.3 °F (36.8 °C)  Pulse:  [] 56  Resp:  [10-25] 16  BP: (127-170)/(39-96) 147/48  SpO2:  [90 %-100 %] 99 %    Wt Readings from Last 6 Encounters:   10/14/24 186 lb (84.4 kg)       Physical Exam:    General: No acute distress.   Respiratory: Clear to auscultation bilaterally. No wheezes. No rhonchi.  Cardiovascular: S1, S2. Regular rate and rhythm. No murmurs, rubs or gallops.   Abdomen: Soft, nontender, nondistended.  Positive bowel sounds. No rebound or guarding.  Neurologic: No focal neurological deficits.   Musculoskeletal: Moves all extremities.  Extremities: No edema.    Results:   Diagnostic Data:      Labs:    Labs Last 24 Hours:   BMP     CBC    Other     Na - Cl - BUN - Glu -   Hb 12.9   PTT - Procal -   K - CO2 - Cr -   WBC - >< PLT -  INR - CRP -   Renal Lytes Endo    Hct 37.8   Trop - D dim -   eGFR - Ca - POC Gluc  -    LFT   pBNP - Lactic -   eGFR AA - PO4 - A1c -   AST - APk - Prot -  LDL -      Recent Labs   Lab 10/11/24  2223 10/15/24  0605   RBC 4.98  --    HGB 15.9 12.9*   HCT 45.9 37.8*   MCV 92.2  --    MCH 31.9  --    MCHC 34.6  --    RDW 12.7  --    NEPRELIM 8.61*  --    WBC 11.1*  --    .0  --        No results found for: \"PT\", \"INR\"    Recent Labs   Lab 10/11/24  2223 10/12/24  0546 10/13/24  0559   GLU 82 81 107*   BUN 13 13 14   CREATSERUM 0.88 1.12 1.08   CA 7.0* 8.9 8.6*     142 140   K 2.9* 4.4 4.3   * 107 106   CO2 19.0* 28.0 29.0       No results for input(s): \"MARTIR\", \"LIP\" in the last 168 hours.    Recent Labs   Lab 10/13/24  0559   MG 2.2       No results for input(s): \"URINE\", \"CULTI\", \"BLDSMR\" in the last 168 hours.      XR TIBIA + FIBULA (2 VIEWS), LEFT (CPT=73590)    Result Date: 10/14/2024  CONCLUSION:  1. ORIF of comminuted distal tibial fracture with intramedullary nail and screws. 2. Anatomic alignment.     Dictated by (CST): Gian Zelaya MD on 10/14/2024 at 7:37 PM     Finalized by (CST): Gian Zelaya MD on 10/14/2024 at 7:38 PM          XR FLUOROSCOPY C-ARM TIME LESS THAN 1 HOUR (CPT=76000)    Result Date: 10/14/2024  CONCLUSION: Fluoroscopic guidance as above.  247.3-seconds of fluoroscopy time were used. 16 fluoroscopic images as well as a 1 page-dose summary image are stored with this exam.  RADIATION DOSE (Dose Area Product):  0.06807-aJd*m^2.   Dictated by (CST): Curt Goldman MD on 10/14/2024 at 6:28 PM     Finalized by (CST): Curt Goldman MD on 10/14/2024 at 6:28 PM          XR KNEE, COMPLETE (4 OR MORE VIEWS), LEFT (CPT=73564)    Result Date: 10/14/2024  CONCLUSION:  1. No acute fracture or dislocation, left knee. 2. Mild left knee joint effusion.    Dictated by (CST): Fritz Corrigan MD on 10/14/2024 at 12:57 PM     Finalized by (CST): Fritz Corrigan MD on 10/14/2024 at 1:00 PM               Pro-Calcitonin  No results for input(s): \"PCT\" in the last 168 hours.    Cardiac  No results for input(s): \"TROP\", \"PBNP\" in the last 168 hours.    Imaging: Imaging data reviewed in Epic.    XR TIBIA + FIBULA (2 VIEWS), LEFT (CPT=73590)    Result Date: 10/14/2024  CONCLUSION:  1. ORIF of comminuted distal tibial fracture with intramedullary nail and screws. 2. Anatomic alignment.     Dictated by (CST): Gian Zelaya MD on 10/14/2024 at 7:37 PM     Finalized by (CST): Gian Zelaay MD on 10/14/2024 at 7:38 PM          XR FLUOROSCOPY C-ARM TIME LESS  THAN 1 HOUR (CPT=76000)    Result Date: 10/14/2024  CONCLUSION: Fluoroscopic guidance as above.  247.3-seconds of fluoroscopy time were used. 16 fluoroscopic images as well as a 1 page-dose summary image are stored with this exam.  RADIATION DOSE (Dose Area Product):  0.33885-vZw*m^2.   Dictated by (CST): Curt Goldman MD on 10/14/2024 at 6:28 PM     Finalized by (CST): Curt Goldman MD on 10/14/2024 at 6:28 PM          XR KNEE, COMPLETE (4 OR MORE VIEWS), LEFT (CPT=73564)    Result Date: 10/14/2024  CONCLUSION:  1. No acute fracture or dislocation, left knee. 2. Mild left knee joint effusion.    Dictated by (CST): Fritz Corrigan MD on 10/14/2024 at 12:57 PM     Finalized by (CST): Fritz Corrigan MD on 10/14/2024 at 1:00 PM              Medications:    sennosides  17.2 mg Oral Nightly    docusate sodium  100 mg Oral BID    multivitamin  1 tablet Oral Daily    aspirin  81 mg Oral BID    acetaminophen  1,000 mg Oral Q8H MICHELE    naproxen  500 mg Oral BID with meals    traMADol  50 mg Oral 4 times per day       Assessment & Plan:   ASSESSMENT / PLAN:     Left tibia/ankle fracture  - s/p closed tibial shart IMN with percutaneous posterior malleolar fixation  - pain control  - NWB LLE  - PT/OT  - ASA 81 mg BID     dvt prophylaxis: ASA BID  code status: full code  dispo: home upon medical clearance    Plan of care discussed with patient   Home soon    Julian Bhat MD, FAAP, FACP  Pending sale to [facility] Hospitalist  I respond to [employer] Chat and [employer] Connect

## 2024-10-15 NOTE — OCCUPATIONAL THERAPY NOTE
OCCUPATIONAL THERAPY EVALUATION - INPATIENT     Room Number: 427/427-A  Evaluation Date: 10/15/2024  Type of Evaluation: Initial  Presenting Problem: Lt tibial and malleolus fx s/p IM nailing    Physician Order: IP Consult to Occupational Therapy  Reason for Therapy: ADL/IADL Dysfunction and Discharge Planning    OCCUPATIONAL THERAPY ASSESSMENT   Patient is a 24 year old male admitted 10/11/2024 for Lt tibial and malleolus fx, s/p IM nailing; NWB LT LE.  Prior to admission, patient's baseline is independent.  Patient is currently functioning near baseline with ADls and functional mobility.  Patient is requiring minimal assist as a result of the following impairments: pain and limited LT LE  ROM. Occupational Therapy will continue to follow for duration of hospitalization.    Patient will benefit from continued skilled OT Services with no additional skilled services but increased support at home.    PLAN DURING HOSPITALIZATION     OT Treatment Plan: ADL training;IADL training;Patient/Family training;Patient/Family education;Compensatory technique education;Equipment eval/education     OCCUPATIONAL THERAPY MEDICAL/SOCIAL HISTORY   Problem List   Principal Problem:    Closed fracture of distal end of left tibia, unspecified fracture morphology, initial encounter  Active Problems:    Tibia fracture    Closed fracture of posterior malleolus of left tibia, initial encounter    HOME SITUATION  Type of Home: Apartment  Home Layout: -- (3rd floor (no elevator))  Lives With: Spouse (wife works P/T)  Toilet and Equipment: Standard height toilet  Shower/Tub and Equipment: Tub-shower combo  Other Equipment: None  Occupation/Status: works P/T for [employer]  Drives: Yes  Patient Regularly Uses: Glasses    Stairs in Home: 3 flights  Assistive Device(s) Used: none     Prior Level of [place]: Independent, living in 3rd floor apartment with his spouse. Pt is a college student and plays hockey. Both pt and wife work P/T.      SUBJECTIVE  \"I'm just scared about how its gonna feel\"    OCCUPATIONAL THERAPY EXAMINATION   OBJECTIVE  Fall Risk: Standard fall risk    WEIGHT BEARING RESTRICTION  L Lower Extremity: Non-Weight Bearing    PAIN ASSESSMENT  Rating: 3  Location: LT LE  Management Techniques: Repositioning; Relaxation    ACTIVITY TOLERANCE  With increased time and encouragement, pt manages requested bed side activity with no SOB, minimal LT LE pain    RANGE OF MOTION   Upper extremity ROM is within functional limits     STRENGTH ASSESSMENT  Upper extremity strength is within functional limits     ACTIVITIES OF DAILY LIVING ASSESSMENT  AM-PAC ‘6-Clicks’ Inpatient Daily Activity Short Form  How much help from another person does the patient currently need…  -   Putting on and taking off regular lower body clothing?: A Little  -   Bathing (including washing, rinsing, drying)?: A Little  -   Toileting, which includes using toilet, bedpan or urinal? : A Little  -   Putting on and taking off regular upper body clothing?: None  -   Taking care of personal grooming such as brushing teeth?: None  -   Eating meals?: None    AM-PAC Score:  Score: 21  Approx Degree of Impairment: 32.79%  Standardized Score (AM-PAC Scale): 44.27  CMS Modifier (G-Code): CJ    FUNCTIONAL TRANSFER ASSESSMENT  Provide SBA after instruction provided for WB status, xfer technique, walker management  Pt manages BR mobility using R?W with supervision after instruction. Pt encouraged to wear a supportive shoe on RT - plans to have wife bring from home    BED MOBILITY  Supine to Sit : Supervision  Sit to Supine (OT): Not Tested    FUNCTIONAL ADL ASSESSMENT  Pt demo the skills to manage seated ADLs using compensatory techniques to avoid dynamic standing- performing EOB with rolling. Pt managing toileting with use of urinal.     Skilled Therapy Provided: Pt received resting in bed; no visitors present. Pt reports he has not yet sat EOB or been OOB.   After instruction, pt  performs bed mobility, functional  xfers (bed, chair, simulated SUV), BR mobility with supervision. Pt simulates the skills to perform LE dressing from EOB using compensatory techniques to minimize dynamic standing components.   Pt indicates some anxiety over managing at home in his current condition and states the plan is for his wife to remain home to assist him as needed.     EDUCATION PROVIDED  Patient Education : Role of Occupational Therapy; Functional Transfer Techniques; Weight Bear Status; Posture/Positioning  Patient's Response to Education: Verbalized Understanding; Returned Demonstration    The patient's Approx Degree of Impairment: 32.79% has been calculated based on documentation in the [facility] '6 clicks' Inpatient Daily Activity Short Form.  Research supports that patients with this level of impairment may benefit from .  Final disposition will be made by interdisciplinary medical team.    Patient End of Session: With  staff;RN aware of session/findings    OT Goals  Patient self-stated goal is: to feel better    Pt will perform simulated SUV xfer with cues  Comment:    Pt will perform tub xfer with recommended DME 1x  Comment:      Comment:     Comment:         Goals  on:10/18/24  Frequency:1 more session    Patient Evaluation Complexity Level:   Occupational Profile/Medical History LOW - Brief history including review of medical or therapy records    Specific performance deficits impacting engagement in ADL/IADL LOW  1 - 3 performance deficits    Client Assessment/Performance Deficits LOW - No comorbidities nor modifications of tasks    Clinical Decision Making LOW - Analysis of occupational profile, problem-focused assessments, limited treatment options    Overall Complexity LOW     Self-Care Home Management: 12 minutes

## 2024-10-15 NOTE — PROGRESS NOTES
[facility]     ORTHO DAILY PROGRESS NOTE    Patient: Dann Toro    Subjective:  Nothing acute overnight.  Pain in the operative extremity is moderate but controlled.   Patient denies new onset numbness/tingling in the operative extremity.  Patient also denies chest pain / shortness of breath, nausea/vomiting.        Objective:  Vitals:    10/15/24 0700   BP: 127/52   Pulse: (!) 47   Resp: 18   Temp: 98.3 °F (36.8 °C)     I/O last 3 completed shifts:  In: 2055 [P.O.:620; I.V.:1335; IV PIGGYBACK:100]  Out: 800 [Urine:800]     Gen: awake, alert, not in acute distress  Abdomen nondistended, non-tender    Left Lower Extremity: dressing clean, dry, intact.    Sensation intact to light touch in over the toes  No pain with passive stretch of the toes  Motor exam : Firing EHL/FHL  Vascular exam: Palpable dorsalis pedis pulses. Cap refill ~2s in the toes. Foot warm and well perfused    Labs:  Lab Results   Component Value Date    HGB 12.9 (L) 10/15/2024    HGB 15.9 10/11/2024     No results found for: \"INR\", \"PROTIME\"      ASSESSMENT/PLAN: 24 year old yo male s/p left closed tibial shaft IMN with perc posterior malleolar fixation on  10/11/2024   - Weight bearing status: NWB LLE  - Range of motion: As tolerated  - Mechanical DVT prophylaxis and chemoprophylaxis with ASA 81 BID   - 24 hours of perioperative antibiotics  - PT for mobilization and motion  - Advance diet as tolerated. Discontinue IV fluids POD1 if tolerating diet.   - Multimodal pain control regimen ordered  - Disposition: Pending PT Evaluation    Omar Behery, MD  [facility]

## 2024-10-15 NOTE — PLAN OF CARE
Problem: Patient Centered Care  Goal: Patient preferences are identified and integrated in the patient's plan of care  Description: Interventions:  - What would you like us to know as we care for you?   - Provide timely, complete, and accurate information to patient/family  - Incorporate patient and family knowledge, values, beliefs, and cultural backgrounds into the planning and delivery of care  - Encourage patient/family to participate in care and decision-making at the level they choose  - Honor patient and family perspectives and choices  Outcome: Progressing     Problem: Patient/Family Goals  Goal: Patient/Family Long Term Goal  Description: Patient's Long Term Goal:     Interventions:  - Pain medication  - PT/OT  - Follow up with MD  - See additional Care Plan goals for specific interventions  Outcome: Progressing  Goal: Patient/Family Short Term Goal  Description: Patient's Short Term Goal:     Interventions:   - Pain medication  - PT/OT  - Monitor lab  - See additional Care Plan goals for specific interventions  Outcome: Progressing     Problem: PAIN - ADULT  Goal: Verbalizes/displays adequate comfort level or patient's stated pain goal  Description: INTERVENTIONS:  - Encourage pt to monitor pain and request assistance  - Assess pain using appropriate pain scale  - Administer analgesics based on type and severity of pain and evaluate response  - Implement non-pharmacological measures as appropriate and evaluate response  - Consider cultural and social influences on pain and pain management  - Manage/alleviate anxiety  - Utilize distraction and/or relaxation techniques  - Monitor for opioid side effects  - Notify MD/LIP if interventions unsuccessful or patient reports new pain  - Anticipate increased pain with activity and pre-medicate as appropriate  Outcome: Progressing     Problem: SAFETY ADULT - FALL  Goal: Free from fall injury  Description: INTERVENTIONS:  - Assess pt frequently for physical needs  -  Identify cognitive and physical deficits and behaviors that affect risk of falls.  - [place] fall precautions as indicated by assessment.  - Educate pt/family on patient safety including physical limitations  - Instruct pt to call for assistance with activity based on assessment  - Modify environment to reduce risk of injury  - Provide assistive devices as appropriate  - Consider OT/PT consult to assist with strengthening/mobility  - Encourage toileting schedule  Outcome: Dax Quigley was resting in bed, c/o increased pain throughout shift. Scheduled pain meds given along with PRN Dilaudid, and Oxy. Left leg maintained NWB, and kept elevated. He's voiding freely via the urinal. Plan for discharge back to home pending PT/OT eval scheduled later today

## 2024-10-15 NOTE — PHYSICAL THERAPY NOTE
Physical Therapy Contact Note    Orders received and chart reviewed. Attempted to see patient for Physical Therapy services at 0909. Patient not available secondary to patient reporting increased pain, requesting PM session with wife present. RN aware, pt pre-medicated ~30 min prior to attempt. Will re-attempt pending patient availability/appropriateness as schedule allows, otherwise will re-schedule visit.    PHYSICAL THERAPY EVALUATION - INPATIENT     Room Number: 427/427-A  Evaluation Date: 10/15/2024  Type of Evaluation: Initial   Physician Order: PT Eval and Treat    Presenting Problem: L tibia fx 2/2 fall while playing hockey, S/P L tibia IMN 10/14/24     Reason for Therapy: Mobility Dysfunction and Discharge Planning    PHYSICAL THERAPY ASSESSMENT   Patient is a 24 year old male admitted 10/11/2024 for L tibia fx 2/2 fall while playing hockey, S/P L tibia IMN.  Prior to admission, patient's baseline is independent without an assistive device.  Patient is currently functioning below baseline with transfers, gait, and stair negotiation.  Patient is requiring stand-by assist and contact guard assist as a result of the following impairments: pain and limited LLE ROM.  Physical Therapy will continue to follow for duration of hospitalization.    Patient will benefit from continued skilled PT Services at discharge to promote prior level of function and safety with additional support and return home with home health PT.    PLAN DURING HOSPITALIZATION  Nursing Mobility Recommendation : 1 Assist  PT Device Recommendation: Rolling walker;Crutches;Wheelchair (walker for ambulation, crutches for stairs, wheelchair for long distances)  PT Treatment Plan: Bed mobility;Body mechanics;Endurance;Energy conservation;Patient education;Family education;Gait training;Strengthening;Stair training;Transfer training;Balance training  Rehab Potential : Good  Frequency (Obs): Daily     PHYSICAL THERAPY MEDICAL/SOCIAL HISTORY      Problem List  Principal Problem:    Closed fracture of distal end of left tibia, unspecified fracture morphology, initial encounter  Active Problems:    Tibia fracture    Closed fracture of posterior malleolus of left tibia, initial encounter      HOME SITUATION  Type of Home: Apartment  Home Layout:  (3rd floor (no elevator))  Stairs to Enter : 24   Railing: Yes              Lives With: Spouse (wife works P/T)    Drives: Yes   Patient Regularly Uses: Glasses     Prior Level of [place]: independent no assistive device, 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, on hockey team    SUBJECTIVE  \"It's funny because before the game yesterday I was saying how you can't predict an injury, and then I got injured.\"    \"Have you seen my nicotine container?\"    PHYSICAL THERAPY EXAMINATION   OBJECTIVE  Precautions: None  Fall Risk: Standard fall risk    WEIGHT BEARING RESTRICTION  L Lower Extremity: Non-Weight Bearing      PAIN ASSESSMENT  Ratin  Location: LLE  Management Techniques: Body mechanics    COGNITION  Overall Cognitive Status:  WFL - within functional limits    RANGE OF MOTION AND STRENGTH ASSESSMENT  Upper extremity ROM and strength are within functional limits  BUEs  Lower extremity ROM is within functional limits  RLE, LLE not assessed 2/2 pain/precautions   Lower extremity strength is within functional limits  RLE, LLE not assessed 2/2 pain/precautions     BALANCE  Static Sitting: Normal  Dynamic Sitting: Normal  Static Standing: Normal  Dynamic Standing: Normal      ACTIVITY TOLERANCE  Pulse: 70  Heart Rate Source: Monitor     BP: (!) 156/95  BP Location: Right arm  BP Method: Automatic  Patient Position: Sitting    AM-PAC '6-Clicks' INPATIENT SHORT FORM - BASIC MOBILITY  How much difficulty does the patient currently have...  Patient Difficulty: Turning over in bed (including adjusting bedclothes, sheets and blankets)?: A Little   Patient Difficulty: Sitting down on and standing up  from a chair with arms (e.g., wheelchair, bedside commode, etc.): A Little   Patient Difficulty: Moving from lying on back to sitting on the side of the bed?: A Little   How much help from another person does the patient currently need...   Help from Another: Moving to and from a bed to a chair (including a wheelchair)?: A Little   Help from Another: Need to walk in hospital room?: A Little   Help from Another: Climbing 3-5 steps with a railing?: A Little     AM-PAC Score:  Raw Score: 18   Approx Degree of Impairment: 46.58%   Standardized Score (AM-PAC Scale): 43.63   CMS Modifier (G-Code): CK    FUNCTIONAL ABILITY STATUS  Functional Mobility/Gait Assessment  Gait Assistance: Contact guard assist;Supervision  Distance (ft): 80+20  Assistive Device: Rolling walker  Pattern: Within Functional Limits (appropriately maintaining NWB on LLE)  Stairs: Stairs  How Many Stairs: 8  Device: 1 Rail;1 Crutch  Assist: Contact guard assist  Pattern: Ascend and Descend  Ascend and Descend : Other (comment) (hop-to, ascending backwards, descending forwards, L handrail and R axillary crutch, x1 anterior LOB ascending with CGA for balance, verbally educated on having  lower on stairs)  Sit to Supine: independent  Sit to Stand: supervision and stand-by assist    Exercise/Education Provided:  Education Provided To: Patient  Patient Education: Role of Physical Therapy;Plan of Care;DME Recommendations;Functional Transfer Techniques;Fall Prevention;Weight Bear Status;Energy Conservation;Gait Training;Stair Training  Patient's Response to Education: Verbalized Understanding;Returned Demonstration          Skilled Therapy Provided: Pt tolerating household distances using rolling walker and able to navigate stairs required to access 3rd floor apartment while maintaining NWB on LLE. Anticipate pt will need rolling walker for household ambulation distances, axillary crutch for stairs, wheelchair for long distances.    Patient is cleared  from a physical therapy standpoint for discharge as they have adequately achieved therapy goals to return home safely, however patient would benefit from continued inpatient therapy services if patient remains hospitalized to further progress towards prior level of function.     Patient received  seated in bedside chair in direct handoff from OT , agreeable to physical therapy evaluation. Vital signs monitored as noted above, no adverse symptoms and patient stable during session. Next session anticipate to progress gait and stair negotiation.    Patient history and/or personal factors that may impact the plan of care include home accessibility concerns. Based on the physical therapy examination of the noted systems and functional activity/participation limitations, the patient presentation is stable given the patient demonstrates no significant barriers to meeting therapy goals.    The patient's Approx Degree of Impairment: 46.58% has been calculated based on documentation in the [facility] '6 clicks' Inpatient Basic Mobility Short Form.  Research supports that patients with this level of impairment may benefit from home-health PT.  Final disposition will be made by interdisciplinary medical team.    Patient End of Session: Up in chair;Needs met;Call light within reach;RN aware of session/findings;All patient questions and concerns addressed;Hospital anti-slip socks    CURRENT GOALS  Goals to be met by: 10/20/24  Patient Goal Patient's self-stated goal is: return home safely   Goal #1 Patient is able to demonstrate supine - sit EOB @ level: independent     Goal #1   Current Status    Goal #2 Patient is able to demonstrate transfers EOB to/from [facility] at assistance level: modified independent with walker - rolling     Goal #2  Current Status    Goal #3 Patient is able to ambulate 50 feet with assist device: walker - rolling at assistance level: modified independent   Goal #3   Current Status    Goal #4 Patient will negotiate  12 stairs/one curb w/ assistive device and supervision   Goal #4   Current Status    Goal #5 Patient to demonstrate independence with home activity/exercise instructions provided to patient in preparation for discharge.   Goal #5   Current Status    Goal #6    Goal #6  Current Status      Patient Evaluation Complexity Level:  History Moderate - 1 or 2 personal factors and/or co-morbidities   Examination of body systems Low -  addressing 1-2 elements   Clinical Presentation Low- Stable   Clinical Decision Making  Low Complexity     Gait Training: 15 minutes  Therapeutic Activity:  14 minutes      Laurence Griffin, PT, DPT  [facility]  Rehab Services - Physical Therapy  [id number]

## 2024-10-15 NOTE — OPERATIVE REPORT
[facility]  OPERATIVE REPORT     DATE OF SURGERY: 10/14/2024     PREOPERATIVE DIAGNOSIS:   Closed Left tibial shaft fracture with intra articular extension  Closed Left ankle fracture - posterior malleolar         POST-OPERATIVE DIAGNOSIS:   Closed Left tibial shaft fracture with intra articular extension  Closed Left ankle fracture - posterior malleolar      PROCEDURE:  Closed reduction and intramedullary nailing of left tibial shaft fracture with intra-articular extension  Open treatment of left ankle posterior malleolar intra-articular fracture.   Intra-operative interpretation of fluoroscopy     Location: [facility]     SURGEON: Omar A Behery, MD  Assistant(s): Amadeo Palacios MD, Karen Malave CSA     Anesthesia type: General  Estimated Blood Loss: 75cc     Drains: None     Specimen: none    Findings: Soft compartments  Spiral comminuted distal tibial shaft fracture  Nondisplaced posterior malleolar fracture, without evidence of syndesmotic instability.     Complications: None immediately apparent     Implants:  Ravi 10mm x 345mm T2 alpha tibial nail. 2 proximal interlocks, 3 distal interlocks (advanced locking).   Anderson 4.0 ASNIS partially threaded screw        Indication:      This is a 24 year old man who presented after injury while playing hockey and sustained a closed tibial shaft fracture with associated posterior malleolar nondisplaced fracture. He was seen in the ER, and leg was splinted in situ. On CT imaging there was an intra-articular split in the coronal plane representing a posterior malleolar fragment. The two treatment options of operative versus non-operative were discussed with the patient pre-operatively, and a shared decision was made to proceed with operative repair of the injury. All risks and benefits of the surgery were discussed with the patient who provided informed consent.      Procedure in detail:     The patient was brought to the operating room  and underwent general anesthesia. The patient was placed in a supine position, with an ipsilateral hip bump. The operative thigh was sterilely prepped circumferentially and draped in a usual sterile fashion.  A bone foam positioner was placed under the left lower extremity.     A surgical pause was conducted to reconfirm the correct patient, site, side, and procedure to be performed.  Perioperative antibiotics were given within one hour prior to the procedure.       A percutaneous incision was made over the distal tibia, and a guidewire for a 4.0 ASNIS screw was inserted from anterior to posterior in the distal aspect of the tibia to stabilize the intra-articular split / posterior malleolar fracture, using fluoroscopic guidance.     A 4cm incision was made proximal to the patella, and cautery was used to dissect down to the quadriceps tendon which was split longitudinally. The suprapatellar instrumentation cannula was inserted into the patellofemoral joint and a guidewire was inserted in the appropriate start point on AP and lateral views. The two additional stabilizing k wires were inserted to hold the cannula. The opening reamer was used to create an entry into the proximal tibia. A ball tip wire was inserted and advanced to the level of the fracture. A pointed reduction clamp was used to clamp the fracture in appropriate alignment through percutaneous incisions. The ball tip wire was advanced into the distal tibia and measured to at 355mm. Prior to reaming, the first guidewire for a 4.0 screw was measured over drilled and a screw was placed to prevent displacement of the intra-articular split. The canal was then reamed to a diameter of 11mm, and a [id number] nail was inserted to the appropriate depth. Alignment was confirmed to be appropriate on fluoroscopic AP and lateral views. Two proximal medial to lateral static interlocking screws were drilled measured and and placed using the targeting jig. The ball tip wire  was removed. Distally, 3 interlocks were drilled, measured and placed using fluoroscopic perfect circles technique. The clamp and distal guidwire were removed. Final fluoroscopic imaging was obtained confirming appropriate fracture alignment, length and rotation as well as implant position.      The wounds were irrigated copiously and the incisions were closed using #1 Vicryl for the quadriceps tendon, 2-0 vicryl for subcutaneous tissues and 3-0 nylon suture for the skin.      Xeroform, gauze, webril were applied over the incision, and a plaster, short leg AO splint was applied.       There were no immediate complications.      I was present and scrubbed throughout the entire surgery.      COMPLICATIONS: None     POST-OPERATIVE PLAN     The patient will receive post-operative antibiotics (ancef) for 24 hours as surgical prophylaxis.    VTE prophylaxis will consist of early mobilization, mechanical compressive devices, and ASA 81 BID if not medically contra-indicated.  The patient will be non-weight bearing for a period of 6 weeks given intra-articular split then advanced to WBAT.   Follow up in 2 weeks for suture removal, xrays and transition to cam boot and start ankle ROM exercises, but remain NWB until 6 weeks postop.

## 2024-10-15 NOTE — PLAN OF CARE
Pt is A&Ox4. RA. SCDs and ASA for dvt prophylaxis. Voiding via urinal. Last BM was 10/11. PRN oxy and dilaudid for pain management. Up 1 with walkerKELLEYE. Saline locked. General diet. L leg-acewrap and splint. Plan for HHC when clear.     Problem: Patient Centered Care  Goal: Patient preferences are identified and integrated in the patient's plan of care  Description: Interventions:  - What would you like us to know as we care for you?   - Provide timely, complete, and accurate information to patient/family  - Incorporate patient and family knowledge, values, beliefs, and cultural backgrounds into the planning and delivery of care  - Encourage patient/family to participate in care and decision-making at the level they choose  - Honor patient and family perspectives and choices  Outcome: Progressing     Problem: Patient/Family Goals  Goal: Patient/Family Long Term Goal  Description: Patient's Long Term Goal:     Interventions:  -   - See additional Care Plan goals for specific interventions  Outcome: Progressing  Goal: Patient/Family Short Term Goal  Description: Patient's Short Term Goal:     Interventions:   -   - See additional Care Plan goals for specific interventions  Outcome: Progressing     Problem: PAIN - ADULT  Goal: Verbalizes/displays adequate comfort level or patient's stated pain goal  Description: INTERVENTIONS:  - Encourage pt to monitor pain and request assistance  - Assess pain using appropriate pain scale  - Administer analgesics based on type and severity of pain and evaluate response  - Implement non-pharmacological measures as appropriate and evaluate response  - Consider cultural and social influences on pain and pain management  - Manage/alleviate anxiety  - Utilize distraction and/or relaxation techniques  - Monitor for opioid side effects  - Notify MD/LIP if interventions unsuccessful or patient reports new pain  - Anticipate increased pain with activity and pre-medicate as  appropriate  Outcome: Progressing     Problem: SAFETY ADULT - FALL  Goal: Free from fall injury  Description: INTERVENTIONS:  - Assess pt frequently for physical needs  - Identify cognitive and physical deficits and behaviors that affect risk of falls.  - [place] fall precautions as indicated by assessment.  - Educate pt/family on patient safety including physical limitations  - Instruct pt to call for assistance with activity based on assessment  - Modify environment to reduce risk of injury  - Provide assistive devices as appropriate  - Consider OT/PT consult to assist with strengthening/mobility  - Encourage toileting schedule  Outcome: Progressing

## 2024-10-16 ENCOUNTER — APPOINTMENT (OUTPATIENT)
Dept: GENERAL RADIOLOGY | Facility: HOSPITAL | Age: 24
End: 2024-10-16
Attending: STUDENT IN AN ORGANIZED HEALTH CARE EDUCATION/TRAINING PROGRAM
Payer: OTHER GOVERNMENT

## 2024-10-16 PROCEDURE — 73610 X-RAY EXAM OF ANKLE: CPT | Performed by: STUDENT IN AN ORGANIZED HEALTH CARE EDUCATION/TRAINING PROGRAM

## 2024-10-16 PROCEDURE — 99232 SBSQ HOSP IP/OBS MODERATE 35: CPT | Performed by: INTERNAL MEDICINE

## 2024-10-16 NOTE — DISCHARGE SUMMARY
[facility]  part of [facility]    Discharge Summary    Dann Toro Patient Status:  Inpatient    2000 MRN [id number]   Location [facility] 4W/SW/SE Attending Martinez Rey MD   Hosp Day # 5 PCP No primary care provider on file.     Date of Admission: 10/11/2024      Date of Discharge: 10/16/24    Admitting Diagnosis: Closed fracture of posterior malleolus of left tibia, initial encounter [S82.392A]  Closed fracture of distal end of left tibia, unspecified fracture morphology, initial encounter [S82.302A]    Hospital Discharge Diagnoses:  losed fracture of posterior malleolus of left tibia, initial encounter [S82.392A]    Lace+ Score: 22  59-90 High Risk  29-58 Medium Risk  0-28   Low Risk.    TCM Follow-Up Recommendation:  LACE < 29: Low Risk of readmission after discharge from the hospital; Still recommend for TCM follow-up.          Problem List:   Patient Active Problem List   Diagnosis    Tibia fracture    Closed fracture of distal end of left tibia, unspecified fracture morphology, initial encounter    Closed fracture of posterior malleolus of left tibia, initial encounter         Physical Exam:   Vitals:    10/16/24 0742   BP: 131/58   Pulse: 52   Resp: 18   Temp: 98.1 °F (36.7 °C)       General: No acute distress.   Respiratory: Clear to auscultation bilaterally. No wheezes. No rhonchi.  Cardiovascular: S1, S2. Regular rate and rhythm. No murmurs, rubs or gallops.   Abdomen: Soft, nontender, nondistended.  Positive bowel sounds. No rebound or guarding.  Neurologic: No focal neurological deficits.   Musculoskeletal: Moves all extremities.  Extremities: No edema.       History of Present Illness: Hospital Course:     Left tibia/ankle fracture  - s/p closed tibial shart IMN with percutaneous posterior malleolar fixation  -asa 81mg BID per ortho  -dispo per ortho-ok for dc    Discharge Condition: Good    Discharge Medications:      Discharge Medications        CONTINUE  taking these medications        Instructions Prescription details   SUMAtriptan Succinate 100 MG Tabs  Commonly known as: IMITREX      Take 1 tablet (100 mg total) by mouth every 6 (six) hours as needed for Migraine.   Refills: 0                  Martinez Rey MD  10/16/2024  12:55 PM    Less than 30 minutes spent on preparation and coordination of this discharge

## 2024-10-16 NOTE — PLAN OF CARE
POD:2. Patient is A&Ox4. RA. General diet. Scds on right leg. Voiding via urinal. Scheduled tramadol & tylenol. PRN Oxycodone for pain management. Up by 1 assist with a walker. NWB to the LLE. Call light within reach, frequent rounding. Safety measures in place. Plan for HH when medically clear.     Problem: Patient Centered Care  Goal: Patient preferences are identified and integrated in the patient's plan of care  Description: Interventions:  - What would you like us to know as we care for you?   - Provide timely, complete, and accurate information to patient/family  - Incorporate patient and family knowledge, values, beliefs, and cultural backgrounds into the planning and delivery of care  - Encourage patient/family to participate in care and decision-making at the level they choose  - Honor patient and family perspectives and choices  Outcome: Progressing     Problem: Patient/Family Goals  Goal: Patient/Family Long Term Goal  Description: Patient's Long Term Goal:     Interventions:  -   - See additional Care Plan goals for specific interventions  Outcome: Progressing  Goal: Patient/Family Short Term Goal  Description: Patient's Short Term Goal:     Interventions:     - See additional Care Plan goals for specific interventions  Outcome: Progressing     Problem: PAIN - ADULT  Goal: Verbalizes/displays adequate comfort level or patient's stated pain goal  Description: INTERVENTIONS:  - Encourage pt to monitor pain and request assistance  - Assess pain using appropriate pain scale  - Administer analgesics based on type and severity of pain and evaluate response  - Implement non-pharmacological measures as appropriate and evaluate response  - Consider cultural and social influences on pain and pain management  - Manage/alleviate anxiety  - Utilize distraction and/or relaxation techniques  - Monitor for opioid side effects  - Notify MD/LIP if interventions unsuccessful or patient reports new pain  - Anticipate  increased pain with activity and pre-medicate as appropriate  Outcome: Progressing     Problem: SAFETY ADULT - FALL  Goal: Free from fall injury  Description: INTERVENTIONS:  - Assess pt frequently for physical needs  - Identify cognitive and physical deficits and behaviors that affect risk of falls.  - [place] fall precautions as indicated by assessment.  - Educate pt/family on patient safety including physical limitations  - Instruct pt to call for assistance with activity based on assessment  - Modify environment to reduce risk of injury  - Provide assistive devices as appropriate  - Consider OT/PT consult to assist with strengthening/mobility  - Encourage toileting schedule  Outcome: Progressing

## 2024-10-16 NOTE — CM/SW NOTE
10/16/24 1200   CM/ Referral Data   Referral Source Physician   Reason for Referral Discharge planning   Informant Patient   Medical Hx   Does patient have an established PCP? Yes   Patient Info   Patient's Current Mental Status at Time of Assessment Alert;Oriented   Patient's Home Environment House   Patient lives with Spouse/Significant other   Patient Status Prior to Admission   Independent with ADLs and Mobility Yes   Discharge Needs   Anticipated D/C needs Home health care     CM met with Graham at bedside. Discussed need for HH on discharge. CM explained due to his [facility] benefits, he will need to obtain HH and therapy orders through them. Pt verbalized understanding.     Per pt, he called the [facility]. He provided me with his ID number for the claim.   ID #: [id number]  [facility] billing department phone number: [phone]    Spoke with Nadya in registration. She will contact the [facility] to attempt to verify benefits.    Tesha Beach RN, BSN  Nurse   [phone]

## 2024-10-16 NOTE — OCCUPATIONAL THERAPY NOTE
OCCUPATIONAL THERAPY TREATMENT NOTE - INPATIENT    Room Number: 427/427-A     Presenting Problem: Lt tibial and malleolus fx s/p IM nailing     Problem List  Principal Problem:    Closed fracture of distal end of left tibia, unspecified fracture morphology, initial encounter  Active Problems:    Tibia fracture    Closed fracture of posterior malleolus of left tibia, initial encounter      OCCUPATIONAL THERAPY ASSESSMENT   Patient demonstrates good  progress this session, goals met.    Patient continues to function near baseline with ADls and functional mobility.  Occupational Therapy will continue to follow patient for duration of hospitalization.    Patient continues to benefit from continued skilled OT services: with no additional skilled services but increased support at home.     PLAN DURING HOSPITALIZATION     OT Treatment Plan: ADL training;IADL training;Patient/Family training;Patient/Family education;Compensatory technique education;Equipment eval/education     SUBJECTIVE  \"I just noticed my RT ankle is bruised and swollen\"    OBJECTIVE  Precautions: None    WEIGHT BEARING RESTRICTION  L Lower Extremity: Non-Weight Bearing    PAIN ASSESSMENT  Rating: -- (did not rate)  Location: LT LE  Management Techniques: Repositioning; Relaxation    ACTIVITIES OF DAILY LIVING ASSESSMENT  AM-PAC ‘6-Clicks’ Inpatient Daily Activity Short Form  How much help from another person does the patient currently need…  -   Putting on and taking off regular lower body clothing?: None  -   Bathing (including washing, rinsing, drying)?: A Little  -   Toileting, which includes using toilet, bedpan or urinal? : None  -   Putting on and taking off regular upper body clothing?: None  -   Taking care of personal grooming such as brushing teeth?: None  -   Eating meals?: None    AM-PAC Score:  Score: 23  Approx Degree of Impairment: 15.86%  Standardized Score (AM-PAC Scale): 51.12  CMS Modifier (G-Code): CI    FUNCTIONAL TRANSFER  ASSESSMENT  Sit to Stand: Edge of Bed; Chair  Edge of Bed: Modified Independent  Chair: Modified Independent    BED MOBILITY  Rolling: Independent  Supine to Sit : Independent  Sit to Supine (OT): Not Tested    FUNCTIONAL ADL ASSESSMENT  Pt performs UE/LE dressing (shorts, sock and shoe) with set up from bed level    Skilled Therapy Provided: Pt expressing concern over discovering RT ankle bruising and swelling. Nurse made aware.   Wife present and included in session. Wife verbalizes good understanding of demo xfer techniques and recommended DME including tub xfer with TTB and simulated car xfer as well as compensatory self care techniques. Discussed positioning, pain management, and pressure relief with pt and wife verbalizing understanding. Pt and wife have no further questions or discharge concerns.     EDUCATION PROVIDED  Patient Education : Role of Occupational Therapy; Discharge Recommendations; Functional Transfer Techniques; Weight Bear Status; Posture/Positioning; Proper Body Mechanics  Patient's Response to Education: Verbalized Understanding; Returned Demonstration  Family/Caregiver's Response to Education: Verbalized Understanding    The patient's Approx Degree of Impairment: 15.86% has been calculated based on documentation in the [facility] '6 clicks' Inpatient Daily Activity Short Form.  Research supports that patients with this level of impairment have no further OT needs.  Final disposition will be made by interdisciplinary medical team.    Patient End of Session: With  staff;RN aware of session/findings;Family present    OT Goals:    Pt will perform simulated SUV xfer with cues  Comment: achieved   Pt will perform tub xfer with recommended DME 1x  Comment: achieved     Comment:     Comment:         Goals  on:10/18/24  Frequency:1 more session    Self-Care Home Management: 15 minutes

## 2024-10-16 NOTE — PHYSICAL THERAPY NOTE
PHYSICAL THERAPY TREATMENT NOTE - INPATIENT     Room Number: 427/427-A       Presenting Problem: L tibia fx 2/2 fall while playing hockey, S/P L tibia IMN 10/14/24       Problem List  Principal Problem:    Closed fracture of distal end of left tibia, unspecified fracture morphology, initial encounter  Active Problems:    Tibia fracture    Closed fracture of posterior malleolus of left tibia, initial encounter      PHYSICAL THERAPY ASSESSMENT   Patient demonstrates fair progress this session, goals  remain in progress.      Patient is requiring supervision, stand-by assist, and contact guard assist as a result of the following impairments: decreased muscular endurance and limited LLE ROM.     Patient continues to function below baseline with bed mobility, transfers, gait, and stair negotiation.  Next session anticipate patient to progress bed mobility, transfers, gait, and stair negotiation.  Physical Therapy will continue to follow patient for duration of hospitalization.    Patient continues to benefit from continued skilled PT services: at discharge to promote prior level of function and safety with additional support and return home with home health PT.    PLAN DURING HOSPITALIZATION  Nursing Mobility Recommendation : 1 Assist  PT Device Recommendation: Rolling walker;Crutches;Wheelchair (walker for ambulation, crutches for stairs, wheelchair for long distances)  PT Treatment Plan: Bed mobility;Body mechanics;Endurance;Coordination;Energy conservation;Patient education;Family education;Gait training;Strengthening;Stair training;Transfer training;Balance training  Frequency (Obs): Daily     SUBJECTIVE  \"I don't feel pain in my right foot just instability\"    OBJECTIVE  Precautions: None    WEIGHT BEARING RESTRICTION  L Lower Extremity: Non-Weight Bearing      PAIN ASSESSMENT   Rating: Unable to rate  Location: LLE  Management Techniques: Body mechanics    BALANCE  Static Sitting: Normal  Dynamic Sitting:  Normal  Static Standing: Normal  Dynamic Standing: Normal    AM-PAC '6-Clicks' INPATIENT SHORT FORM - BASIC MOBILITY  How much difficulty does the patient currently have...  Patient Difficulty: Turning over in bed (including adjusting bedclothes, sheets and blankets)?: A Little   Patient Difficulty: Sitting down on and standing up from a chair with arms (e.g., wheelchair, bedside commode, etc.): A Little   Patient Difficulty: Moving from lying on back to sitting on the side of the bed?: A Little   How much help from another person does the patient currently need...   Help from Another: Moving to and from a bed to a chair (including a wheelchair)?: A Little   Help from Another: Need to walk in hospital room?: A Little   Help from Another: Climbing 3-5 steps with a railing?: A Little     AM-PAC Score:  Raw Score: 18   Approx Degree of Impairment: 46.58%   Standardized Score (AM-PAC Scale): 43.63   CMS Modifier (G-Code): CK    FUNCTIONAL ABILITY STATUS  Functional Mobility/Gait Assessment  Gait Assistance: Supervision  Distance (ft): 8ft+8ft  Assistive Device: Rolling walker  Pattern:  (Maintains LLE NWB)  Stairs: Stairs  How Many Stairs: 4  Device: 1 Rail;1 Crutch  Assist: Contact guard assist  Pattern: Ascend and Descend  Ascend and Descend :  (ascent backward, descent forward)  Sit to Supine: supervision  Sit to Stand: stand-by assist    Skilled Therapy Provided: Pt rec'd in hallway agreeable to therapy, identified by name and , gait belt donned for mobility. Wife at bedside. WC transport provided for energy conservation. Pt and family education provided on an alternate option for stair navigation to promote safety and energy conservation that include stair bumping 2/2 wife reports presence of 3 flights of stairs to enter apartment. Pt reports preference in utilizing one crutch and one hand rail however pt c/o ankle instability in RLE after stair navigation. Pt requires SBA for STS transfers vc for RW proximity.  Pt with supervision to accomplish bed mobility and gait. Pt demos appropriate BUE strength to accomplish ADLs and maintain LLE NWB status. Pt in bed to conclude treatment.    The patient's Approx Degree of Impairment: 46.58% has been calculated based on documentation in the [facility] '6 clicks' Inpatient Daily Activity Short Form.  Research supports that patients with this level of impairment may benefit from HHPT.  Final disposition will be made by interdisciplinary medical team.    Patient End of Session: In bed;Needs met;RN aware of session/findings;Call light within reach    CURRENT GOALS   Goals to be met by: 10/20/24  Patient Goal Patient's self-stated goal is: return home safely   Goal #1 Patient is able to demonstrate supine - sit EOB @ level: independent      Goal #1   Current Status  NOT MET/ IN PROGRESS   Goal #2 Patient is able to demonstrate transfers EOB to/from [facility] at assistance level: modified independent with walker - rolling      Goal #2  Current Status   NOT MET/ IN PROGRESS   Goal #3 Patient is able to ambulate 50 feet with assist device: walker - rolling at assistance level: modified independent   Goal #3   Current Status   NOT MET/ IN PROGRESS   Goal #4 Patient will negotiate 12 stairs/one curb w/ assistive device and supervision   Goal #4   Current Status   NOT MET/ IN PROGRESS   Goal #5 Patient to demonstrate independence with home activity/exercise instructions provided to patient in preparation for discharge.   Goal #5   Current Status   NOT MET/ IN PROGRESS   Goal #6     Goal #6  Current Status       Therapeutic Activity: 15 minutes    This treatment was completed under direct supervision of clinical instructor. I have reviewed and agree with the above documentation.    Bina Mahmood, BOYD Hensley, PTA

## 2024-10-16 NOTE — PLAN OF CARE
Pt is A&Ox4. RA. SCDs and ASA for dvt prophylaxis. Voiding via urinal. Last BM was 10/11. PRN oxy and dilaudid for pain management. Up 1 with walker, NWB LLE. No IV access. General diet. L leg-acewrap and splint. Plan for HHC when clear. Dr. Behery to round tomorrow and assess pts R ankle.       Problem: Patient Centered Care  Goal: Patient preferences are identified and integrated in the patient's plan of care  Description: Interventions:  - What would you like us to know as we care for you?   - Provide timely, complete, and accurate information to patient/family  - Incorporate patient and family knowledge, values, beliefs, and cultural backgrounds into the planning and delivery of care  - Encourage patient/family to participate in care and decision-making at the level they choose  - Honor patient and family perspectives and choices  Outcome: Progressing     Problem: Patient/Family Goals  Goal: Patient/Family Long Term Goal  Description: Patient's Long Term Goal:     Interventions:  -   - See additional Care Plan goals for specific interventions  Outcome: Progressing  Goal: Patient/Family Short Term Goal  Description: Patient's Short Term Goal:     Interventions:     - See additional Care Plan goals for specific interventions  Outcome: Progressing     Problem: PAIN - ADULT  Goal: Verbalizes/displays adequate comfort level or patient's stated pain goal  Description: INTERVENTIONS:  - Encourage pt to monitor pain and request assistance  - Assess pain using appropriate pain scale  - Administer analgesics based on type and severity of pain and evaluate response  - Implement non-pharmacological measures as appropriate and evaluate response  - Consider cultural and social influences on pain and pain management  - Manage/alleviate anxiety  - Utilize distraction and/or relaxation techniques  - Monitor for opioid side effects  - Notify MD/LIP if interventions unsuccessful or patient reports new pain  - Anticipate increased  pain with activity and pre-medicate as appropriate  Outcome: Progressing     Problem: SAFETY ADULT - FALL  Goal: Free from fall injury  Description: INTERVENTIONS:  - Assess pt frequently for physical needs  - Identify cognitive and physical deficits and behaviors that affect risk of falls.  - [place] fall precautions as indicated by assessment.  - Educate pt/family on patient safety including physical limitations  - Instruct pt to call for assistance with activity based on assessment  - Modify environment to reduce risk of injury  - Provide assistive devices as appropriate  - Consider OT/PT consult to assist with strengthening/mobility  - Encourage toileting schedule  Outcome: Progressing

## 2024-10-17 VITALS
BODY MASS INDEX: 27.55 KG/M2 | OXYGEN SATURATION: 98 % | DIASTOLIC BLOOD PRESSURE: 77 MMHG | HEART RATE: 52 BPM | HEIGHT: 69 IN | TEMPERATURE: 98 F | SYSTOLIC BLOOD PRESSURE: 139 MMHG | WEIGHT: 186 LBS | RESPIRATION RATE: 18 BRPM

## 2024-10-17 PROCEDURE — 99238 HOSP IP/OBS DSCHRG MGMT 30/<: CPT | Performed by: INTERNAL MEDICINE

## 2024-10-17 RX ORDER — SUMATRIPTAN 50 MG/1
100 TABLET, FILM COATED ORAL ONCE
Status: COMPLETED | OUTPATIENT
Start: 2024-10-17 | End: 2024-10-17

## 2024-10-17 NOTE — PROGRESS NOTES
Right ankle pain and swelling examined this am.   Moderate soft tissue swelling over the lateral ankle.  TTP over the ATFL. No TTP over the base of the 5th metatarsal  Ankle exam stable      Xrays of the right ankle reviewed - no obvious fracture      Recommended obtaining an aircast on the right ankle to be worn with ambulation.   WBAT RLE.

## 2024-10-17 NOTE — CM/SW NOTE
10/16/24 1200   Discharge disposition   Expected discharge disposition Home or Self   Discharge transportation Private car     Pt discussed during nursing rounds. Pt is stable for [place] today. MD dc order entered. CM unable to reach anyone at [facility] billing department through their automated system. Pt agreeable to follow up with [facility] contact to coordinate either  therapies or outpatient PT. Pt's spouse will provide transport at [place].    Plan: Home w/spouse with [facility] approved post acute services today.    / to remain available for support and/or discharge planning.     DUC MckinleyN    [phone]

## 2024-10-17 NOTE — PROGRESS NOTES
Progress Note     Dann Toro Patient Status:  Inpatient    2000 MRN [id number]   Location [facility] 4W/SW/SE Attending Martinez Rey MD   Hosp Day # 6 PCP No primary care provider on file.     Chief Complaint:   Chief Complaint   Patient presents with    Leg or Foot Injury         Subjective:   S: Patient seen and examined, chart reviewed.   Has pain post op, + headache      Review of Systems:   10 point ROS completed and was negative, except for pertinent positive and negatives stated in subjective.                Objective:   Vital signs:  Temp:  [97.7 °F (36.5 °C)-97.9 °F (36.6 °C)] 97.9 °F (36.6 °C)  Pulse:  [52-62] 52  Resp:  [18] 18  BP: (137-154)/(48-77) 139/77  SpO2:  [98 %] 98 %    Wt Readings from Last 6 Encounters:   10/14/24 186 lb (84.4 kg)       Physical Exam:    General: No acute distress.   Respiratory: Clear to auscultation bilaterally. No wheezes. No rhonchi.  Cardiovascular: S1, S2. Regular rate and rhythm. No murmurs, rubs or gallops.   Abdomen: Soft, nontender, nondistended.  Positive bowel sounds. No rebound or guarding.  Neurologic: No focal neurological deficits.   Musculoskeletal: Moves all extremities.  Extremities: No edema.    Results:   Diagnostic Data:      Labs:    Labs Last 24 Hours:   BMP     CBC    Other     Na - Cl - BUN - Glu -   Hb -   PTT - Procal -   K - CO2 - Cr -   WBC - >< PLT -  INR - CRP -   Renal Lytes Endo    Hct -   Trop - D dim -   eGFR - Ca - POC Gluc  -    LFT   pBNP - Lactic -   eGFR AA - PO4 - A1c -   AST - APk - Prot -  LDL -      Recent Labs   Lab 10/11/24  2223 10/15/24  0605   RBC 4.98  --    HGB 15.9 12.9*   HCT 45.9 37.8*   MCV 92.2  --    MCH 31.9  --    MCHC 34.6  --    RDW 12.7  --    NEPRELIM 8.61*  --    WBC 11.1*  --    .0  --        No results found for: \"PT\", \"INR\"    Recent Labs   Lab 10/11/24  2223 10/12/24  0546 10/13/24  0559   GLU 82 81 107*   BUN 13 13 14   CREATSERUM 0.88 1.12 1.08   CA 7.0* 8.9 8.6*    142  140   K 2.9* 4.4 4.3   * 107 106   CO2 19.0* 28.0 29.0       No results for input(s): \"MARTIR\", \"LIP\" in the last 168 hours.    Recent Labs   Lab 10/13/24  0559   MG 2.2       No results for input(s): \"URINE\", \"CULTI\", \"BLDSMR\" in the last 168 hours.      XR ANKLE (MIN 3 VIEWS), RIGHT (CPT=73610)    Result Date: 10/16/2024  CONCLUSION: Normal examination.     Dictated by (CST): Timmy Fine MD on 10/16/2024 at 3:50 PM     Finalized by (CST): Timmy Fine MD on 10/16/2024 at 3:51 PM               Pro-Calcitonin  No results for input(s): \"PCT\" in the last 168 hours.    Cardiac  No results for input(s): \"TROP\", \"PBNP\" in the last 168 hours.    Imaging: Imaging data reviewed in Epic.    XR ANKLE (MIN 3 VIEWS), RIGHT (CPT=73610)    Result Date: 10/16/2024  CONCLUSION: Normal examination.     Dictated by (CST): Timmy Fine MD on 10/16/2024 at 3:50 PM     Finalized by (CST): Timmy Fine MD on 10/16/2024 at 3:51 PM              Medications:    sennosides  17.2 mg Oral Nightly    docusate sodium  100 mg Oral BID    multivitamin  1 tablet Oral Daily    aspirin  81 mg Oral BID    acetaminophen  1,000 mg Oral Q8H MICHELE    naproxen  500 mg Oral BID with meals    traMADol  50 mg Oral 4 times per day       Assessment & Plan:   ASSESSMENT / PLAN:     Left tibia/ankle fracture  - s/p closed tibial shart IMN with percutaneous posterior malleolar fixation  - pain control  - NWB LLE  - PT/OT  - ASA 81 mg BID per ortho    Migraine HA  -home meds     dvt prophylaxis: ASA BID  code status: full code  dispo: home upon surgical clearance    Plan of care discussed with patient   Home soon    Matrinez Rey MD  [facility] Hospitalist

## 2024-10-17 NOTE — PLAN OF CARE
POD:3. Patient is A&Ox4. RA. General diet. Scds on right leg. Voiding via urinal. Scheduled tramadol & tylenol. PRN Oxycodone for pain management. Up by 1 assist with a walker. NWB to the LLE. Call light within reach, frequent rounding. Safety measures in place. Plan for HH when medically clear.       Problem: Patient Centered Care  Goal: Patient preferences are identified and integrated in the patient's plan of care  Description: Interventions:  - What would you like us to know as we care for you?   - Provide timely, complete, and accurate information to patient/family  - Incorporate patient and family knowledge, values, beliefs, and cultural backgrounds into the planning and delivery of care  - Encourage patient/family to participate in care and decision-making at the level they choose  - Honor patient and family perspectives and choices  Outcome: Progressing     Problem: Patient/Family Goals  Goal: Patient/Family Long Term Goal  Description: Patient's Long Term Goal:     Interventions:  -   - See additional Care Plan goals for specific interventions  Outcome: Progressing  Goal: Patient/Family Short Term Goal  Description: Patient's Short Term Goal:    Interventions:   -  - See additional Care Plan goals for specific interventions  Outcome: Progressing     Problem: PAIN - ADULT  Goal: Verbalizes/displays adequate comfort level or patient's stated pain goal  Description: INTERVENTIONS:  - Encourage pt to monitor pain and request assistance  - Assess pain using appropriate pain scale  - Administer analgesics based on type and severity of pain and evaluate response  - Implement non-pharmacological measures as appropriate and evaluate response  - Consider cultural and social influences on pain and pain management  - Manage/alleviate anxiety  - Utilize distraction and/or relaxation techniques  - Monitor for opioid side effects  - Notify MD/LIP if interventions unsuccessful or patient reports new pain  - Anticipate  increased pain with activity and pre-medicate as appropriate  Outcome: Progressing     Problem: SAFETY ADULT - FALL  Goal: Free from fall injury  Description: INTERVENTIONS:  - Assess pt frequently for physical needs  - Identify cognitive and physical deficits and behaviors that affect risk of falls.  - [place] fall precautions as indicated by assessment.  - Educate pt/family on patient safety including physical limitations  - Instruct pt to call for assistance with activity based on assessment  - Modify environment to reduce risk of injury  - Provide assistive devices as appropriate  - Consider OT/PT consult to assist with strengthening/mobility  - Encourage toileting schedule  Outcome: Progressing

## 2024-10-17 NOTE — DISCHARGE SUMMARY
[facility]  part of [facility]    Discharge Summary    Dann Toro Patient Status:  Inpatient    2000 MRN [id number]   Location [facility] 4W/SW/SE Attending Martinez Rey MD   Hosp Day # 6 PCP No primary care provider on file.     Date of Admission: 10/11/2024      Date of Discharge: 10/1724    Admitting Diagnosis: Closed fracture of posterior malleolus of left tibia, initial encounter [S82.392A]  Closed fracture of distal end of left tibia, unspecified fracture morphology, initial encounter [S82.302A]    Hospital Discharge Diagnoses:  losed fracture of posterior malleolus of left tibia, initial encounter [S82.392A]    Lace+ Score: 48  59-90 High Risk  29-58 Medium Risk  0-28   Low Risk.    TCM Follow-Up Recommendation:  LACE < 29: Low Risk of readmission after discharge from the hospital; Still recommend for TCM follow-up.          Problem List:   Patient Active Problem List   Diagnosis    Tibia fracture    Closed fracture of distal end of left tibia, unspecified fracture morphology, initial encounter    Closed fracture of posterior malleolus of left tibia, initial encounter         Physical Exam:   Vitals:    10/17/24 0738   BP: 139/77   Pulse: 52   Resp: 18   Temp: 97.9 °F (36.6 °C)       General: No acute distress.   Respiratory: Clear to auscultation bilaterally. No wheezes. No rhonchi.  Cardiovascular: S1, S2. Regular rate and rhythm. No murmurs, rubs or gallops.   Abdomen: Soft, nontender, nondistended.  Positive bowel sounds. No rebound or guarding.  Neurologic: No focal neurological deficits.   Musculoskeletal: Moves all extremities. Left leg in soft cast  Extremities: No edema.       History of Present Illness:   Dann Toro is a 24 year old male with history of migraines who presented to the ED with complaints of left foot pain.  Patient fell while playing hockey.  He fell on his knees, he used his ankles to brace himself.  Developed left foot pain, EMT  called.  EMT had to cut off his boots, he was splinted by medics prior to ED arrival.  No numbness or tingling.  Intact pulses noted by ED exam.  No head injury or loss of consciousness.  No other reported injuries.  In ER Laboratory potassium 2.9-replaced.   Vitals with HTN-resolved  X-ray with oblique fracture of the distal left tibial diaphysis with mild displacement and comminution.  Nondisplaced posterior malleolar fracture of the distal tibia with extension to the tibial talar joint.    Hospital Course:   Orthopedics consulted.  Has been admitted for further treatment.    Left tibia/ankle fracture  - s/p closed tibial shart IMN with percutaneous posterior malleolar fixation  -asa 81mg BID per ortho; nwb to LLE  -dispo per ortho-ok for dc    Right ankle pain  -xr ok, no fx, aircast to be worn with ambulation    Hypokalemia  -resolved      F/u ortho in 1 week    Discharge Condition: Good    Discharge Medications:      Discharge Medications        CONTINUE taking these medications        Instructions Prescription details   SUMAtriptan Succinate 100 MG Tabs  Commonly known as: IMITREX      Take 1 tablet (100 mg total) by mouth every 6 (six) hours as needed for Migraine.   Refills: 0                  Martinez Rey MD  10/17/2024  12:55 PM    Less than 30 minutes spent on preparation and coordination of this discharge   Initial (On Arrival)

## 2024-10-17 NOTE — PLAN OF CARE
Progress adequate for discharge to home per MDs and therapists. Has walker for home, discharge instructions given, verbalized understanding; will  scripts at his pharmacy.

## 2024-10-18 ENCOUNTER — PATIENT OUTREACH (OUTPATIENT)
Dept: CASE MANAGEMENT | Age: 24
End: 2024-10-18

## 2024-10-18 NOTE — PROGRESS NOTES
NCM attempted to reach the patient to complete a [facility] follow up call. Phone rings 10+ times and no option to leave a message to call back. NCM to follow up at a later time.

## (undated) DEVICE — SOLUTION IRRIG 1000ML 0.9% NACL USP BTL

## (undated) DEVICE — CANNULATED DRILL

## (undated) DEVICE — FIXATION K-WIRE SPI, WCH COATED: Brand: T2

## (undated) DEVICE — PREMIERPRO LAP SPONGE 18"X18" STERILE, 5/PK: Brand: PREMIERPRO

## (undated) DEVICE — REAMER SHAFT, MOD.TRINKLE: Brand: BIXCUT

## (undated) DEVICE — ENCORE® LATEX ORTHO SIZE 8.5, STERILE LATEX POWDER-FREE SURGICAL GLOVE: Brand: ENCORE

## (undated) DEVICE — TETRA-FLEX CF WOVEN LATEX FREE ELASTIC BANDAGE 6" X 5.5 YD: Brand: TETRA-FLEX™CF

## (undated) DEVICE — THREADED GUIDE WIRE

## (undated) DEVICE — SPONGE 4X4 10PK

## (undated) DEVICE — ELECTRODE ES RET 2 PATE W/ 10FT CRD MPLR DISP

## (undated) DEVICE — STERILE SYNTHETIC POLYISOPRENE POWDER-FREE SURGICAL GLOVES WITH HYDROGEL COATING, SMOOTH FINISH, STRAIGHT FINGER: Brand: PROTEXIS

## (undated) DEVICE — SUT ETHLN 3-0 30IN FS-1 NABSRB BLK 24MM 3/8 C

## (undated) DEVICE — 3M™ STERI-STRIP™ REINFORCED ADHESIVE SKIN CLOSURES, R1547, 1/2 IN X 4 IN (12 MM X 100 MM), 6 STRIPS/ENVELOPE: Brand: 3M™ STERI-STRIP™

## (undated) DEVICE — PACK,UNIVERSAL,SPLIT,III: Brand: MEDLINE

## (undated) DEVICE — SURG GL, SENSICARE PI ORTHO LT,LF,PF,7.5: Brand: MEDLINE

## (undated) DEVICE — SPONGE LAP 18X18IN WHT COT 4 PLY FLD STRUNG

## (undated) DEVICE — LOCKING DRILL

## (undated) DEVICE — SUT COAT VCRL 2-0 27IN ABSRB UD 26MM CT-2

## (undated) DEVICE — FREEHAND DRILL

## (undated) DEVICE — GUIDE WIRE, BALL-TIPPED, STERILE

## (undated) DEVICE — PACK CDS LOWER EXTREMITY

## (undated) DEVICE — ENCORE® LATEX MICRO SIZE 8.5, STERILE LATEX POWDER-FREE SURGICAL GLOVE: Brand: ENCORE

## (undated) DEVICE — C-ARM: Brand: UNBRANDED

## (undated) DEVICE — ANTIBACTERIAL UNDYED BRAIDED (POLYGLACTIN 910), SYNTHETIC ABSORBABLE SUTURE: Brand: COATED VICRYL

## (undated) DEVICE — SKIN PREP TRAY 4 COMPARTM TRAY: Brand: MEDLINE INDUSTRIES, INC.

## (undated) DEVICE — C-ARMOR C-ARM EQUIPMENT COVERS CLEAR STERILE UNIVERSAL FIT 12 PER CASE: Brand: C-ARMOR

## (undated) DEVICE — NAIL INSERTION SLEEVE, ELASTIC SPI DIAM.8-11: Brand: T2

## (undated) DEVICE — 3M™ STERI-DRAPE™ PATIENT ISOLATION DRAPE 1014: Brand: STERI-DRAPE™

## (undated) DEVICE — APPLICATOR PREP 26ML CHG 2% ISO ALC 70%

## (undated) DEVICE — Device

## (undated) DEVICE — STERILE POLYISOPRENE POWDER-FREE SURGICAL GLOVES: Brand: PROTEXIS

## (undated) DEVICE — ANTIBACTERIAL VIOLET BRAIDED (POLYGLACTIN 910), SYNTHETIC ABSORBABLE SUTURE: Brand: COATED VICRYL

## (undated) DEVICE — SUT COAT VCRL 0 27IN CP-1 ABSRB UD 36MM 1/2

## (undated) DEVICE — CANNULATED SCREW
Type: IMPLANTABLE DEVICE | Site: TIBIA | Status: NON-FUNCTIONAL
Brand: ASNIS
Removed: 2024-10-14

## (undated) DEVICE — K-WIRE

## (undated) DEVICE — LOCKING SCALPEL: Brand: T2 ALPHA

## (undated) DEVICE — COTTON UNDERCAST PADDING,REGULAR FINISH: Brand: WEBRIL

## (undated) DEVICE — GAMMEX® PI HYBRID SIZE 8.5, STERILE POWDER-FREE SURGICAL GLOVE, POLYISOPRENE AND NEOPRENE BLEND: Brand: GAMMEX

## (undated) DEVICE — YANKAUER,FLEXIBLE HANDLE,REGLR CAPACITY: Brand: MEDLINE INDUSTRIES, INC.

## (undated) DEVICE — 3M™ STERI-DRAPE™ U-DRAPE 1015: Brand: STERI-DRAPE™

## (undated) DEVICE — SLIM BODY SKIN STAPLER: Brand: APPOSE ULC

## (undated) DEVICE — SUCTION CANISTER, 3000CC,SAFELINER: Brand: DEROYAL

## (undated) DEVICE — DRAPE,U/ SHT,SPLIT,PLAS,STERIL: Brand: MEDLINE

## (undated) DEVICE — HANDLE SUR BLU PLAS LT FLX SLIP ON ST DISP

## (undated) DEVICE — OCCLUSIVE GAUZE STRIP,3% BISMUTH TRIBROMOPHENATE IN PETROLATUM BLEND: Brand: XEROFORM

## (undated) NOTE — LETTER
[facility]  [address], [place], [place]    Authorization for Surgical Operation and Procedure                               I hereby authorize Ziggy Akbar MD, my physician and his/her assistants (if applicable), which may include medical students, residents, and/or fellows, to perform the following surgical operation/ procedure and administer such anesthesia as may be determined necessary by my physician: Operation/Procedure name (s) LEFT TIBIA IM NAILING on Dann Toro   2.   I recognize that during the surgical operation/procedure, unforeseen conditions may necessitate additional or different procedures than those listed above.  I, therefore, further authorize and request that the above-named surgeon, assistants, or designees perform such procedures as are, in their judgment, necessary and desirable.    3.   My surgeon/physician has discussed prior to my surgery the potential benefits, risks and side effects of this procedure; the likelihood of achieving goals; and potential problems that might occur during recuperation.  They also discussed reasonable alternatives to the procedure, including risks, benefits, and side effects related to the alternatives and risks related to not receiving this procedure.  I have had all my questions answered and I acknowledge that no guarantee has been made as to the result that may be obtained.    4.   Should the need arise during my operation/procedure, which includes change of level of care prior to discharge, I also consent to the administration of blood and/or blood products.  Further, I understand that despite careful testing and screening of blood or blood products by collecting agencies, I may still be subject to ill effects as a result of receiving a blood transfusion and/or blood products.  The following are some, but not all, of the potential risks that can occur: fever and allergic reactions, hemolytic reactions, transmission  of diseases such as Hepatitis, AIDS and Cytomegalovirus (CMV) and fluid overload.  In the event that I wish to have an autologous transfusion of my own blood, or a directed donor transfusion, I will discuss this with my physician.  Check only if Refusing Blood or Blood Products  I understand refusal of blood or blood products as deemed necessary by my physician may have serious consequences to my condition to include possible death. I hereby assume responsibility for my refusal and release the hospital, its personnel, and my physicians from any responsibility for the consequences of my refusal.    o  Refuse   5.   I authorize the use of any specimen, organs, tissues, body parts or foreign objects that may be removed from my body during the operation/procedure for diagnosis, research or teaching purposes and their subsequent disposal by hospital authorities.  I also authorize the release of specimen test results and/or written reports to my treating physician on the hospital medical staff or other referring or consulting physicians involved in my care, at the discretion of the Pathologist or my treating physician.    6.   I consent to the photographing or videotaping of the operations or procedures to be performed, including appropriate portions of my body for medical, scientific, or educational purposes, provided my identity is not revealed by the pictures or by descriptive texts accompanying them.  If the procedure has been photographed/videotaped, the surgeon will obtain the original picture, image, videotape or CD.  The hospital will not be responsible for storage, release or maintenance of the picture, image, tape or CD.    7.   I consent to the presence of a  or observers in the operating room as deemed necessary by my physician or their designees.    8.   I recognize that in the event my procedure results in extended X-Ray/fluoroscopy time, I may develop a skin reaction.    9. If I have a Do  Not Attempt Resuscitation (DNAR) order in place, that status will be suspended while in the operating room, procedural suite, and during the recovery period unless otherwise explicitly stated by me (or a person authorized to consent on my behalf). The surgeon or my attending physician will determine when the applicable recovery period ends for purposes of reinstating the DNAR order.  10. Patients having a sterilization procedure: I understand that if the procedure is successful the results will be permanent and it will therefore be impossible for me to inseminate, conceive, or bear children.  I also understand that the procedure is intended to result in sterility, although the result has not been guaranteed.   11. I acknowledge that my physician has explained sedation/analgesia administration to me including the risk and benefits I consent to the administration of sedation/analgesia as may be necessary or desirable in the judgment of my physician.    I CERTIFY THAT I HAVE READ AND FULLY UNDERSTAND THE ABOVE CONSENT TO OPERATION and/or OTHER PROCEDURE.     ____________________________________  _________________________________        ______________________________  Signature of Patient    Signature of Responsible Person                Printed Name of Responsible Person                                      ____________________________________  _____________________________                ________________________________  Signature of Witness        Date  Time         Relationship to Patient    STATEMENT OF PHYSICIAN My signature below affirms that prior to the time of the procedure; I have explained to the patient and/or his/her legal representative, the risks and benefits involved in the proposed treatment and any reasonable alternative to the proposed treatment. I have also explained the risks and benefits involved in refusal of the proposed treatment and alternatives to the proposed treatment and have answered the  patient's questions. If I have a significant financial interest in a co-management agreement or a significant financial interest in any product or implant, or other significant relationship used in this procedure/surgery, I have disclosed this and had a discussion with my patient.     _____________________________________________________              _____________________________  (Signature of Physician)                                                                                         (Date)                                   (Time)  Patient Name: Dann Toro      : 2000      Printed: 10/12/2024     Medical Record #: [id number]                                      Page 1 of 1

## (undated) NOTE — LETTER
[facility]  [address], [place], [place]    Authorization for Surgical Operation and Procedure                               I hereby authorize Ziggy Akbar MD, my physician and his/her assistants (if applicable), which may include medical students, residents, and/or fellows, to perform the following surgical operation/ procedure and administer such anesthesia as may be determined necessary by my physician: Operation/Procedure name (s) INTRAMEDULLARY NAIL FIXATION LEFT TIBIA UNDER FLUOROSCOPIC GUIDANCE on Dann Toro   2.   I recognize that during the surgical operation/procedure, unforeseen conditions may necessitate additional or different procedures than those listed above.  I, therefore, further authorize and request that the above-named surgeon, assistants, or designees perform such procedures as are, in their judgment, necessary and desirable.    3.   My surgeon/physician has discussed prior to my surgery the potential benefits, risks and side effects of this procedure; the likelihood of achieving goals; and potential problems that might occur during recuperation.  They also discussed reasonable alternatives to the procedure, including risks, benefits, and side effects related to the alternatives and risks related to not receiving this procedure.  I have had all my questions answered and I acknowledge that no guarantee has been made as to the result that may be obtained.    4.   Should the need arise during my operation/procedure, which includes change of level of care prior to discharge, I also consent to the administration of blood and/or blood products.  Further, I understand that despite careful testing and screening of blood or blood products by collecting agencies, I may still be subject to ill effects as a result of receiving a blood transfusion and/or blood products.  The following are some, but not all, of the potential risks that can occur: fever and allergic  reactions, hemolytic reactions, transmission of diseases such as Hepatitis, AIDS and Cytomegalovirus (CMV) and fluid overload.  In the event that I wish to have an autologous transfusion of my own blood, or a directed donor transfusion, I will discuss this with my physician.  Check only if Refusing Blood or Blood Products  I understand refusal of blood or blood products as deemed necessary by my physician may have serious consequences to my condition to include possible death. I hereby assume responsibility for my refusal and release the hospital, its personnel, and my physicians from any responsibility for the consequences of my refusal.    o  Refuse   5.   I authorize the use of any specimen, organs, tissues, body parts or foreign objects that may be removed from my body during the operation/procedure for diagnosis, research or teaching purposes and their subsequent disposal by hospital authorities.  I also authorize the release of specimen test results and/or written reports to my treating physician on the hospital medical staff or other referring or consulting physicians involved in my care, at the discretion of the Pathologist or my treating physician.    6.   I consent to the photographing or videotaping of the operations or procedures to be performed, including appropriate portions of my body for medical, scientific, or educational purposes, provided my identity is not revealed by the pictures or by descriptive texts accompanying them.  If the procedure has been photographed/videotaped, the surgeon will obtain the original picture, image, videotape or CD.  The hospital will not be responsible for storage, release or maintenance of the picture, image, tape or CD.    7.   I consent to the presence of a  or observers in the operating room as deemed necessary by my physician or their designees.    8.   I recognize that in the event my procedure results in extended X-Ray/fluoroscopy time, I may  develop a skin reaction.    9. If I have a Do Not Attempt Resuscitation (DNAR) order in place, that status will be suspended while in the operating room, procedural suite, and during the recovery period unless otherwise explicitly stated by me (or a person authorized to consent on my behalf). The surgeon or my attending physician will determine when the applicable recovery period ends for purposes of reinstating the DNAR order.  10. Patients having a sterilization procedure: I understand that if the procedure is successful the results will be permanent and it will therefore be impossible for me to inseminate, conceive, or bear children.  I also understand that the procedure is intended to result in sterility, although the result has not been guaranteed.   11. I acknowledge that my physician has explained sedation/analgesia administration to me including the risk and benefits I consent to the administration of sedation/analgesia as may be necessary or desirable in the judgment of my physician.    I CERTIFY THAT I HAVE READ AND FULLY UNDERSTAND THE ABOVE CONSENT TO OPERATION and/or OTHER PROCEDURE.     ____________________________________  _________________________________        ______________________________  Signature of Patient    Signature of Responsible Person                Printed Name of Responsible Person                                      ____________________________________  _____________________________                ________________________________  Signature of Witness        Date  Time         Relationship to Patient    STATEMENT OF PHYSICIAN My signature below affirms that prior to the time of the procedure; I have explained to the patient and/or his/her legal representative, the risks and benefits involved in the proposed treatment and any reasonable alternative to the proposed treatment. I have also explained the risks and benefits involved in refusal of the proposed treatment and alternatives to  the proposed treatment and have answered the patient's questions. If I have a significant financial interest in a co-management agreement or a significant financial interest in any product or implant, or other significant relationship used in this procedure/surgery, I have disclosed this and had a discussion with my patient.     _____________________________________________________              _____________________________  (Signature of Physician)                                                                                         (Date)                                   (Time)  Patient Name: Dann Toro      : 2000      Printed: 10/13/2024     Medical Record #: [id number]                                      Page 1 of 1

## (undated) NOTE — LETTER
[facility]  Administration of Anesthesia  I, Dann Toro agree to be cared for by a physician anesthesiologist alone and/or with a nurse anesthetist, who is specially trained to monitor me and give me medicine to put me to sleep or keep me comfortable during my procedure    I understand that my anesthesiologist and/or anesthetist is not an employee or agent of [facility] or [employer]. He or she works for [facility]    As the patient asking for anesthesia services, I agree to:  Allow the anesthesiologist (anesthesia doctor) to give me medicine and do additional procedures as necessary. Some examples are: Starting or using an “IV” to give me medicine, fluids or blood during my procedure, and having a breathing tube placed to help me breathe when I’m asleep (intubation). In the event that my heart stops working properly, I understand that my anesthesiologist will make every effort to sustain my life, unless otherwise directed by [facility] Do Not Resuscitate documents.  Tell my anesthesia doctor before my procedure:  If I am pregnant.  The last time that I ate or drank.  iii. All of the medicines I take (including prescriptions, herbal supplements, and pills I can buy without a prescription (including street drugs/illegal medications). Failure to inform my anesthesiologist about these medicines may increase my risk of anesthetic complications.  iv.If I am allergic to anything or have had a reaction to anesthesia before.  I understand how the anesthesia medicine will help me (benefits).  I understand that with any type of anesthesia medicine there are risks:  The most common risks are: nausea, vomiting, sore throat, muscle soreness, damage to my eyes, mouth, or teeth (from breathing tube placement).  Rare risks include: remembering what happened during my procedure, allergic reactions to medications, injury to my airway, heart, lungs, vision, nerves, or  muscles and in extremely rare instances death.  My doctor has explained to me other choices available to me for my care (alternatives).  Pregnant Patients (“epidural”):  I understand that the risks of having an epidural (medicine given into my back to help control pain during labor), include itching, low blood pressure, difficulty urinating, headache or slowing of the baby’s heart. Very rare risks include infection, bleeding, seizure, irregular heart rhythms and nerve injury.  Regional Anesthesia (“spinal”, “epidural”, & “nerve blocks”):  I understand that rare but potential complications include headache, bleeding, infection, seizure, irregular heart rhythms, and nerve injury.    _____________________________________________________________________________  Patient (or Representative) Signature/Relationship to Patient  Date   Time    _____________________________________________________________________________   Name (if used)    Language/Organization   Time    _____________________________________________________________________________  Nurse Anesthetist Signature     Date   Time  _____________________________________________________________________________  Anesthesiologist Signature     Date   Time  I have discussed the procedure and information above with the patient (or patient’s representative) and answered their questions. The patient or their representative has agreed to have anesthesia services.    _____________________________________________________________________________  Witness        Date   Time  I have verified that the signature is that of the patient or patient’s representative, and that it was signed before the procedure  Patient Name: Dann Toro     : 2000                 Printed: 10/14/2024 at 2:08 PM    Medical Record #: [id number]                                            Page 1 of 1  ----------ANESTHESIA CONSENT----------

## (undated) NOTE — LETTER
[facility]  Administration of Anesthesia  I, Dann Toro agree to be cared for by a physician anesthesiologist alone and/or with a nurse anesthetist, who is specially trained to monitor me and give me medicine to put me to sleep or keep me comfortable during my procedure    I understand that my anesthesiologist and/or anesthetist is not an employee or agent of [facility] or [employer]. He or she works for [facility]    As the patient asking for anesthesia services, I agree to:  Allow the anesthesiologist (anesthesia doctor) to give me medicine and do additional procedures as necessary. Some examples are: Starting or using an “IV” to give me medicine, fluids or blood during my procedure, and having a breathing tube placed to help me breathe when I’m asleep (intubation). In the event that my heart stops working properly, I understand that my anesthesiologist will make every effort to sustain my life, unless otherwise directed by [facility] Do Not Resuscitate documents.  Tell my anesthesia doctor before my procedure:  If I am pregnant.  The last time that I ate or drank.  iii. All of the medicines I take (including prescriptions, herbal supplements, and pills I can buy without a prescription (including street drugs/illegal medications). Failure to inform my anesthesiologist about these medicines may increase my risk of anesthetic complications.  iv.If I am allergic to anything or have had a reaction to anesthesia before.  I understand how the anesthesia medicine will help me (benefits).  I understand that with any type of anesthesia medicine there are risks:  The most common risks are: nausea, vomiting, sore throat, muscle soreness, damage to my eyes, mouth, or teeth (from breathing tube placement).  Rare risks include: remembering what happened during my procedure, allergic reactions to medications, injury to my airway, heart, lungs, vision, nerves, or  muscles and in extremely rare instances death.  My doctor has explained to me other choices available to me for my care (alternatives).  Pregnant Patients (“epidural”):  I understand that the risks of having an epidural (medicine given into my back to help control pain during labor), include itching, low blood pressure, difficulty urinating, headache or slowing of the baby’s heart. Very rare risks include infection, bleeding, seizure, irregular heart rhythms and nerve injury.  Regional Anesthesia (“spinal”, “epidural”, & “nerve blocks”):  I understand that rare but potential complications include headache, bleeding, infection, seizure, irregular heart rhythms, and nerve injury.    _____________________________________________________________________________  Patient (or Representative) Signature/Relationship to Patient  Date   Time    _____________________________________________________________________________   Name (if used)    Language/Organization   Time    _____________________________________________________________________________  Nurse Anesthetist Signature     Date   Time  _____________________________________________________________________________  Anesthesiologist Signature     Date   Time  I have discussed the procedure and information above with the patient (or patient’s representative) and answered their questions. The patient or their representative has agreed to have anesthesia services.    _____________________________________________________________________________  Witness        Date   Time  I have verified that the signature is that of the patient or patient’s representative, and that it was signed before the procedure  Patient Name: Dann Toro     : 2000                 Printed: 10/12/2024 at 8:00 AM    Medical Record #: [id number]                                            Page 1 of 1  ----------ANESTHESIA CONSENT----------

## (undated) NOTE — LETTER
[facility]  [address], [place], [place]    Authorization for Surgical Operation and Procedure                               I hereby authorize Ziggy Akbar MD, my physician and his/her assistants (if applicable), which may include medical students, residents, and/or fellows, to perform the following surgical operation/ procedure and administer such anesthesia as may be determined necessary by my physician: Operation/Procedure name (s) LEFT TIBIA INTRAMEDULLARY NAILING on Dann Toro   2.   I recognize that during the surgical operation/procedure, unforeseen conditions may necessitate additional or different procedures than those listed above.  I, therefore, further authorize and request that the above-named surgeon, assistants, or designees perform such procedures as are, in their judgment, necessary and desirable.    3.   My surgeon/physician has discussed prior to my surgery the potential benefits, risks and side effects of this procedure; the likelihood of achieving goals; and potential problems that might occur during recuperation.  They also discussed reasonable alternatives to the procedure, including risks, benefits, and side effects related to the alternatives and risks related to not receiving this procedure.  I have had all my questions answered and I acknowledge that no guarantee has been made as to the result that may be obtained.    4.   Should the need arise during my operation/procedure, which includes change of level of care prior to discharge, I also consent to the administration of blood and/or blood products.  Further, I understand that despite careful testing and screening of blood or blood products by collecting agencies, I may still be subject to ill effects as a result of receiving a blood transfusion and/or blood products.  The following are some, but not all, of the potential risks that can occur: fever and allergic reactions, hemolytic reactions,  transmission of diseases such as Hepatitis, AIDS and Cytomegalovirus (CMV) and fluid overload.  In the event that I wish to have an autologous transfusion of my own blood, or a directed donor transfusion, I will discuss this with my physician.  Check only if Refusing Blood or Blood Products  I understand refusal of blood or blood products as deemed necessary by my physician may have serious consequences to my condition to include possible death. I hereby assume responsibility for my refusal and release the hospital, its personnel, and my physicians from any responsibility for the consequences of my refusal.    o  Refuse   5.   I authorize the use of any specimen, organs, tissues, body parts or foreign objects that may be removed from my body during the operation/procedure for diagnosis, research or teaching purposes and their subsequent disposal by hospital authorities.  I also authorize the release of specimen test results and/or written reports to my treating physician on the hospital medical staff or other referring or consulting physicians involved in my care, at the discretion of the Pathologist or my treating physician.    6.   I consent to the photographing or videotaping of the operations or procedures to be performed, including appropriate portions of my body for medical, scientific, or educational purposes, provided my identity is not revealed by the pictures or by descriptive texts accompanying them.  If the procedure has been photographed/videotaped, the surgeon will obtain the original picture, image, videotape or CD.  The hospital will not be responsible for storage, release or maintenance of the picture, image, tape or CD.    7.   I consent to the presence of a  or observers in the operating room as deemed necessary by my physician or their designees.    8.   I recognize that in the event my procedure results in extended X-Ray/fluoroscopy time, I may develop a skin reaction.    9. If  I have a Do Not Attempt Resuscitation (DNAR) order in place, that status will be suspended while in the operating room, procedural suite, and during the recovery period unless otherwise explicitly stated by me (or a person authorized to consent on my behalf). The surgeon or my attending physician will determine when the applicable recovery period ends for purposes of reinstating the DNAR order.  10. Patients having a sterilization procedure: I understand that if the procedure is successful the results will be permanent and it will therefore be impossible for me to inseminate, conceive, or bear children.  I also understand that the procedure is intended to result in sterility, although the result has not been guaranteed.   11. I acknowledge that my physician has explained sedation/analgesia administration to me including the risk and benefits I consent to the administration of sedation/analgesia as may be necessary or desirable in the judgment of my physician.    I CERTIFY THAT I HAVE READ AND FULLY UNDERSTAND THE ABOVE CONSENT TO OPERATION and/or OTHER PROCEDURE.     ____________________________________  _________________________________        ______________________________  Signature of Patient    Signature of Responsible Person                Printed Name of Responsible Person                                      ____________________________________  _____________________________                ________________________________  Signature of Witness        Date  Time         Relationship to Patient    STATEMENT OF PHYSICIAN My signature below affirms that prior to the time of the procedure; I have explained to the patient and/or his/her legal representative, the risks and benefits involved in the proposed treatment and any reasonable alternative to the proposed treatment. I have also explained the risks and benefits involved in refusal of the proposed treatment and alternatives to the proposed treatment and have  answered the patient's questions. If I have a significant financial interest in a co-management agreement or a significant financial interest in any product or implant, or other significant relationship used in this procedure/surgery, I have disclosed this and had a discussion with my patient.     _____________________________________________________              _____________________________  (Signature of Physician)                                                                                         (Date)                                   (Time)  Patient Name: Dann Toro      : 2000      Printed: 10/12/2024     Medical Record #: [id number]                                      Page 1 of 1

## (undated) NOTE — LETTER
[address], [place], [place]    Authorization for Surgical Operation and Procedure                               I hereby authorize Behery, Omar Atef, MD, my physician and his/her assistants (if applicable), which may include medical students, residents, and/or fellows, to perform the following surgical operation/ procedure and administer such anesthesia as may be determined necessary by my physician: Operation/Procedure name (s) INTRAMEDULLARY NAIL FIXATION LEFT TIBIA UNDER FLUOROSCOPIC GUIDANCE on Dann Toro   2.   I recognize that during the surgical operation/procedure, unforeseen conditions may necessitate additional or different procedures than those listed above.  I, therefore, further authorize and request that the above-named surgeon, assistants, or designees perform such procedures as are, in their judgment, necessary and desirable.    3.   My surgeon/physician has discussed prior to my surgery the potential benefits, risks and side effects of this procedure; the likelihood of achieving goals; and potential problems that might occur during recuperation.  They also discussed reasonable alternatives to the procedure, including risks, benefits, and side effects related to the alternatives and risks related to not receiving this procedure.  I have had all my questions answered and I acknowledge that no guarantee has been made as to the result that may be obtained.    4.   Should the need arise during my operation/procedure, which includes change of level of care prior to discharge, I also consent to the administration of blood and/or blood products.  Further, I understand that despite careful testing and screening of blood or blood products by collecting agencies, I may still be subject to ill effects as a result of receiving a blood transfusion and/or blood products.  The following are some, but not all, of the potential risks that can occur: fever and allergic  reactions, hemolytic reactions, transmission of diseases such as Hepatitis, AIDS and Cytomegalovirus (CMV) and fluid overload.  In the event that I wish to have an autologous transfusion of my own blood, or a directed donor transfusion, I will discuss this with my physician.  Check only if Refusing Blood or Blood Products  I understand refusal of blood or blood products as deemed necessary by my physician may have serious consequences to my condition to include possible death. I hereby assume responsibility for my refusal and release the hospital, its personnel, and my physicians from any responsibility for the consequences of my refusal.    o  Refuse   5.   I authorize the use of any specimen, organs, tissues, body parts or foreign objects that may be removed from my body during the operation/procedure for diagnosis, research or teaching purposes and their subsequent disposal by hospital authorities.  I also authorize the release of specimen test results and/or written reports to my treating physician on the hospital medical staff or other referring or consulting physicians involved in my care, at the discretion of the Pathologist or my treating physician.    6.   I consent to the photographing or videotaping of the operations or procedures to be performed, including appropriate portions of my body for medical, scientific, or educational purposes, provided my identity is not revealed by the pictures or by descriptive texts accompanying them.  If the procedure has been photographed/videotaped, the surgeon will obtain the original picture, image, videotape or CD.  The hospital will not be responsible for storage, release or maintenance of the picture, image, tape or CD.    7.   I consent to the presence of a  or observers in the operating room as deemed necessary by my physician or their designees.    8.   I recognize that in the event my procedure results in extended X-Ray/fluoroscopy time, I may  develop a skin reaction.    9. If I have a Do Not Attempt Resuscitation (DNAR) order in place, that status will be suspended while in the operating room, procedural suite, and during the recovery period unless otherwise explicitly stated by me (or a person authorized to consent on my behalf). The surgeon or my attending physician will determine when the applicable recovery period ends for purposes of reinstating the DNAR order.  10. Patients having a sterilization procedure: I understand that if the procedure is successful the results will be permanent and it will therefore be impossible for me to inseminate, conceive, or bear children.  I also understand that the procedure is intended to result in sterility, although the result has not been guaranteed.   11. I acknowledge that my physician has explained sedation/analgesia administration to me including the risk and benefits I consent to the administration of sedation/analgesia as may be necessary or desirable in the judgment of my physician.    I CERTIFY THAT I HAVE READ AND FULLY UNDERSTAND THE ABOVE CONSENT TO OPERATION and/or OTHER PROCEDURE.     ____________________________________  _________________________________        ______________________________  Signature of Patient    Signature of Responsible Person                Printed Name of Responsible Person                                      ____________________________________  _____________________________                ________________________________  Signature of Witness        Date  Time         Relationship to Patient    STATEMENT OF PHYSICIAN My signature below affirms that prior to the time of the procedure; I have explained to the patient and/or his/her legal representative, the risks and benefits involved in the proposed treatment and any reasonable alternative to the proposed treatment. I have also explained the risks and benefits involved in refusal of the proposed treatment and alternatives to  the proposed treatment and have answered the patient's questions. If I have a significant financial interest in a co-management agreement or a significant financial interest in any product or implant, or other significant relationship used in this procedure/surgery, I have disclosed this and had a discussion with my patient.     _____________________________________________________              _____________________________  (Signature of Physician)                                                                                         (Date)                                   (Time)  Patient Name: Dann Toro      : 2000      Printed: 10/14/2024     Medical Record #: [id number]                                      Page 1 of 1

## (undated) NOTE — LETTER
[facility]  Administration of Anesthesia  I, Dann Toro agree to be cared for by a physician anesthesiologist alone and/or with a nurse anesthetist, who is specially trained to monitor me and give me medicine to put me to sleep or keep me comfortable during my procedure    I understand that my anesthesiologist and/or anesthetist is not an employee or agent of [facility] or [email or web address]. He or she works for [facility]    As the patient asking for anesthesia services, I agree to:  Allow the anesthesiologist (anesthesia doctor) to give me medicine and do additional procedures as necessary. Some examples are: Starting or using an “IV” to give me medicine, fluids or blood during my procedure, and having a breathing tube placed to help me breathe when I’m asleep (intubation). In the event that my heart stops working properly, I understand that my anesthesiologist will make every effort to sustain my life, unless otherwise directed by [facility] Do Not Resuscitate documents.  Tell my anesthesia doctor before my procedure:  If I am pregnant.  The last time that I ate or drank.  iii. All of the medicines I take (including prescriptions, herbal supplements, and pills I can buy without a prescription (including street drugs/illegal medications). Failure to inform my anesthesiologist about these medicines may increase my risk of anesthetic complications.  iv.If I am allergic to anything or have had a reaction to anesthesia before.  I understand how the anesthesia medicine will help me (benefits).  I understand that with any type of anesthesia medicine there are risks:  The most common risks are: nausea, vomiting, sore throat, muscle soreness, damage to my eyes, mouth, or teeth (from breathing tube placement).  Rare risks include: remembering what happened during my procedure, allergic reactions to medications, injury to my airway, heart, lungs, vision, nerves, or  muscles and in extremely rare instances death.  My doctor has explained to me other choices available to me for my care (alternatives).  Pregnant Patients (“epidural”):  I understand that the risks of having an epidural (medicine given into my back to help control pain during labor), include itching, low blood pressure, difficulty urinating, headache or slowing of the baby’s heart. Very rare risks include infection, bleeding, seizure, irregular heart rhythms and nerve injury.  Regional Anesthesia (“spinal”, “epidural”, & “nerve blocks”):  I understand that rare but potential complications include headache, bleeding, infection, seizure, irregular heart rhythms, and nerve injury.    _____________________________________________________________________________  Patient (or Representative) Signature/Relationship to Patient  Date   Time    _____________________________________________________________________________   Name (if used)    Language/Organization   Time    _____________________________________________________________________________  Nurse Anesthetist Signature     Date   Time  _____________________________________________________________________________  Anesthesiologist Signature     Date   Time  I have discussed the procedure and information above with the patient (or patient’s representative) and answered their questions. The patient or their representative has agreed to have anesthesia services.    _____________________________________________________________________________  Witness        Date   Time  I have verified that the signature is that of the patient or patient’s representative, and that it was signed before the procedure  Patient Name: Dann Toro     : 2000                 Printed: 10/12/2024 at 4:43 AM    Medical Record #: [id number]                                            Page 1 of 1  ----------ANESTHESIA CONSENT----------